# Patient Record
Sex: FEMALE | Race: OTHER | HISPANIC OR LATINO | URBAN - METROPOLITAN AREA
[De-identification: names, ages, dates, MRNs, and addresses within clinical notes are randomized per-mention and may not be internally consistent; named-entity substitution may affect disease eponyms.]

---

## 2018-04-16 ENCOUNTER — EMERGENCY (EMERGENCY)
Facility: HOSPITAL | Age: 73
LOS: 1 days | Discharge: HOME | End: 2018-04-16
Attending: EMERGENCY MEDICINE
Payer: MEDICARE

## 2018-04-16 VITALS — HEIGHT: 61 IN | WEIGHT: 179.9 LBS

## 2018-04-16 VITALS — HEART RATE: 90 BPM | SYSTOLIC BLOOD PRESSURE: 156 MMHG | DIASTOLIC BLOOD PRESSURE: 88 MMHG

## 2018-04-16 PROBLEM — Z00.00 ENCOUNTER FOR PREVENTIVE HEALTH EXAMINATION: Status: ACTIVE | Noted: 2018-04-16

## 2018-04-16 LAB
ALBUMIN SERPL ELPH-MCNC: 3.5 G/DL — SIGNIFICANT CHANGE UP (ref 3.5–5)
ALP SERPL-CCNC: 91 U/L — SIGNIFICANT CHANGE UP (ref 40–120)
ALT FLD-CCNC: 35 U/L DA — SIGNIFICANT CHANGE UP (ref 10–60)
ANION GAP SERPL CALC-SCNC: 8 MMOL/L — SIGNIFICANT CHANGE UP (ref 5–17)
AST SERPL-CCNC: 23 U/L — SIGNIFICANT CHANGE UP (ref 10–40)
BASOPHILS # BLD AUTO: 0.1 K/UL — SIGNIFICANT CHANGE UP (ref 0–0.2)
BASOPHILS NFR BLD AUTO: 0.9 % — SIGNIFICANT CHANGE UP (ref 0–2)
BILIRUB SERPL-MCNC: 0.2 MG/DL — SIGNIFICANT CHANGE UP (ref 0.2–1.2)
BUN SERPL-MCNC: 18 MG/DL — SIGNIFICANT CHANGE UP (ref 7–18)
CALCIUM SERPL-MCNC: 8.7 MG/DL — SIGNIFICANT CHANGE UP (ref 8.4–10.5)
CHLORIDE SERPL-SCNC: 107 MMOL/L — SIGNIFICANT CHANGE UP (ref 96–108)
CO2 SERPL-SCNC: 26 MMOL/L — SIGNIFICANT CHANGE UP (ref 22–31)
CREAT SERPL-MCNC: 0.64 MG/DL — SIGNIFICANT CHANGE UP (ref 0.5–1.3)
D DIMER BLD IA.RAPID-MCNC: 263 NG/ML DDU — HIGH
EOSINOPHIL # BLD AUTO: 0.3 K/UL — SIGNIFICANT CHANGE UP (ref 0–0.5)
EOSINOPHIL NFR BLD AUTO: 3.4 % — SIGNIFICANT CHANGE UP (ref 0–6)
GLUCOSE SERPL-MCNC: 103 MG/DL — HIGH (ref 70–99)
HCT VFR BLD CALC: 41.9 % — SIGNIFICANT CHANGE UP (ref 34.5–45)
HGB BLD-MCNC: 13.9 G/DL — SIGNIFICANT CHANGE UP (ref 11.5–15.5)
LIDOCAIN IGE QN: 211 U/L — SIGNIFICANT CHANGE UP (ref 73–393)
LYMPHOCYTES # BLD AUTO: 2.1 K/UL — SIGNIFICANT CHANGE UP (ref 1–3.3)
LYMPHOCYTES # BLD AUTO: 27.1 % — SIGNIFICANT CHANGE UP (ref 13–44)
MCHC RBC-ENTMCNC: 31.3 PG — SIGNIFICANT CHANGE UP (ref 27–34)
MCHC RBC-ENTMCNC: 33.2 GM/DL — SIGNIFICANT CHANGE UP (ref 32–36)
MCV RBC AUTO: 94.1 FL — SIGNIFICANT CHANGE UP (ref 80–100)
MONOCYTES # BLD AUTO: 0.5 K/UL — SIGNIFICANT CHANGE UP (ref 0–0.9)
MONOCYTES NFR BLD AUTO: 7 % — SIGNIFICANT CHANGE UP (ref 2–14)
NEUTROPHILS # BLD AUTO: 4.8 K/UL — SIGNIFICANT CHANGE UP (ref 1.8–7.4)
NEUTROPHILS NFR BLD AUTO: 61.5 % — SIGNIFICANT CHANGE UP (ref 43–77)
PLATELET # BLD AUTO: 187 K/UL — SIGNIFICANT CHANGE UP (ref 150–400)
POTASSIUM SERPL-MCNC: 4.1 MMOL/L — SIGNIFICANT CHANGE UP (ref 3.5–5.3)
POTASSIUM SERPL-SCNC: 4.1 MMOL/L — SIGNIFICANT CHANGE UP (ref 3.5–5.3)
PROT SERPL-MCNC: 7.5 G/DL — SIGNIFICANT CHANGE UP (ref 6–8.3)
RBC # BLD: 4.45 M/UL — SIGNIFICANT CHANGE UP (ref 3.8–5.2)
RBC # FLD: 12.7 % — SIGNIFICANT CHANGE UP (ref 10.3–14.5)
SODIUM SERPL-SCNC: 141 MMOL/L — SIGNIFICANT CHANGE UP (ref 135–145)
TROPONIN I SERPL-MCNC: <0.015 NG/ML — SIGNIFICANT CHANGE UP (ref 0–0.04)
TROPONIN I SERPL-MCNC: <0.015 NG/ML — SIGNIFICANT CHANGE UP (ref 0–0.04)
WBC # BLD: 7.7 K/UL — SIGNIFICANT CHANGE UP (ref 3.8–10.5)
WBC # FLD AUTO: 7.7 K/UL — SIGNIFICANT CHANGE UP (ref 3.8–10.5)

## 2018-04-16 PROCEDURE — 71046 X-RAY EXAM CHEST 2 VIEWS: CPT

## 2018-04-16 PROCEDURE — 85027 COMPLETE CBC AUTOMATED: CPT

## 2018-04-16 PROCEDURE — 80053 COMPREHEN METABOLIC PANEL: CPT

## 2018-04-16 PROCEDURE — 83690 ASSAY OF LIPASE: CPT

## 2018-04-16 PROCEDURE — 84484 ASSAY OF TROPONIN QUANT: CPT

## 2018-04-16 PROCEDURE — 71046 X-RAY EXAM CHEST 2 VIEWS: CPT | Mod: 26

## 2018-04-16 PROCEDURE — 85379 FIBRIN DEGRADATION QUANT: CPT

## 2018-04-16 PROCEDURE — 99285 EMERGENCY DEPT VISIT HI MDM: CPT

## 2018-04-16 PROCEDURE — 93005 ELECTROCARDIOGRAM TRACING: CPT

## 2018-04-16 PROCEDURE — 99284 EMERGENCY DEPT VISIT MOD MDM: CPT | Mod: 25

## 2018-04-16 RX ORDER — SODIUM CHLORIDE 9 MG/ML
3 INJECTION INTRAMUSCULAR; INTRAVENOUS; SUBCUTANEOUS ONCE
Qty: 0 | Refills: 0 | Status: COMPLETED | OUTPATIENT
Start: 2018-04-16 | End: 2018-04-16

## 2018-04-16 RX ORDER — ASPIRIN/CALCIUM CARB/MAGNESIUM 324 MG
325 TABLET ORAL ONCE
Qty: 0 | Refills: 0 | Status: COMPLETED | OUTPATIENT
Start: 2018-04-16 | End: 2018-04-16

## 2018-04-16 RX ORDER — FAMOTIDINE 10 MG/ML
1 INJECTION INTRAVENOUS
Qty: 28 | Refills: 0
Start: 2018-04-16 | End: 2018-04-29

## 2018-04-16 RX ORDER — NITROGLYCERIN 6.5 MG
0.4 CAPSULE, EXTENDED RELEASE ORAL
Qty: 0 | Refills: 0 | Status: DISCONTINUED | OUTPATIENT
Start: 2018-04-16 | End: 2018-04-20

## 2018-04-16 RX ORDER — FAMOTIDINE 10 MG/ML
20 INJECTION INTRAVENOUS DAILY
Qty: 0 | Refills: 0 | Status: DISCONTINUED | OUTPATIENT
Start: 2018-04-16 | End: 2018-04-20

## 2018-04-16 RX ORDER — SUCRALFATE 1 G
1 TABLET ORAL ONCE
Qty: 0 | Refills: 0 | Status: COMPLETED | OUTPATIENT
Start: 2018-04-16 | End: 2018-04-16

## 2018-04-16 RX ORDER — SUCRALFATE 1 G
1 TABLET ORAL
Qty: 56 | Refills: 0
Start: 2018-04-16 | End: 2018-04-29

## 2018-04-16 RX ADMIN — Medication 325 MILLIGRAM(S): at 18:48

## 2018-04-16 RX ADMIN — FAMOTIDINE 20 MILLIGRAM(S): 10 INJECTION INTRAVENOUS at 18:48

## 2018-04-16 RX ADMIN — Medication 0.4 MILLIGRAM(S): at 18:49

## 2018-04-16 RX ADMIN — Medication 1 GRAM(S): at 18:48

## 2018-04-16 RX ADMIN — SODIUM CHLORIDE 3 MILLILITER(S): 9 INJECTION INTRAMUSCULAR; INTRAVENOUS; SUBCUTANEOUS at 18:33

## 2018-04-16 NOTE — ED PROVIDER NOTE - OBJECTIVE STATEMENT
72 y/o F pt w/ PMhx of HTN, gallstones, HLD present c/o chest pressure radiating up to throat x today. Associated SOB, nausea, lightheadedness. No hx of similar sx. Took 2 Motrins w/ no relief. No recent infections. No hx of PE, DVT, cancer. No recent travel. Only on blood pressure meds. Denies any other complaints. Allergic to codeine.

## 2018-04-16 NOTE — ED PROVIDER NOTE - PROGRESS NOTE DETAILS
labs reviewed, trop neg x 2, ecg no ischemia. cxr no focal consolidation. offered admission, pt and daughter at bedside. both prefer following up with private cardiologist for provocative testing. able to arrange prompt follow up

## 2018-04-16 NOTE — ED PROVIDER NOTE - MEDICAL DECISION MAKING DETAILS
feels better. labs, ecg, cxr reassuring.   Discussed anticipatory guidance and return precautions. Discussed results and gave copy to pt.  Dc with outpt follow up.

## 2020-02-20 PROBLEM — J45.909 UNSPECIFIED ASTHMA, UNCOMPLICATED: Chronic | Status: ACTIVE | Noted: 2018-04-16

## 2020-02-20 PROBLEM — H40.9 UNSPECIFIED GLAUCOMA: Chronic | Status: ACTIVE | Noted: 2018-04-16

## 2020-02-20 PROBLEM — K80.20 CALCULUS OF GALLBLADDER WITHOUT CHOLECYSTITIS WITHOUT OBSTRUCTION: Chronic | Status: ACTIVE | Noted: 2018-04-17

## 2020-02-20 PROBLEM — E78.5 HYPERLIPIDEMIA, UNSPECIFIED: Chronic | Status: ACTIVE | Noted: 2018-04-17

## 2020-02-20 PROBLEM — I10 ESSENTIAL (PRIMARY) HYPERTENSION: Chronic | Status: ACTIVE | Noted: 2018-04-17

## 2020-03-10 ENCOUNTER — APPOINTMENT (OUTPATIENT)
Dept: SURGERY | Facility: CLINIC | Age: 75
End: 2020-03-10
Payer: MEDICARE

## 2020-03-10 VITALS
WEIGHT: 178 LBS | BODY MASS INDEX: 34.95 KG/M2 | HEART RATE: 94 BPM | OXYGEN SATURATION: 94 % | SYSTOLIC BLOOD PRESSURE: 161 MMHG | HEIGHT: 60 IN | DIASTOLIC BLOOD PRESSURE: 82 MMHG

## 2020-03-10 DIAGNOSIS — H40.9 UNSPECIFIED GLAUCOMA: ICD-10-CM

## 2020-03-10 DIAGNOSIS — I10 ESSENTIAL (PRIMARY) HYPERTENSION: ICD-10-CM

## 2020-03-10 DIAGNOSIS — J45.909 UNSPECIFIED ASTHMA, UNCOMPLICATED: ICD-10-CM

## 2020-03-10 PROCEDURE — 99203 OFFICE O/P NEW LOW 30 MIN: CPT

## 2020-03-10 RX ORDER — BUDESONIDE AND FORMOTEROL FUMARATE DIHYDRATE 160; 4.5 UG/1; UG/1
AEROSOL RESPIRATORY (INHALATION)
Refills: 0 | Status: ACTIVE | COMMUNITY

## 2020-03-10 RX ORDER — LATANOPROST/PF 0.005 %
DROPS OPHTHALMIC (EYE)
Refills: 0 | Status: ACTIVE | COMMUNITY

## 2020-03-10 RX ORDER — IRBESARTAN 300 MG/1
TABLET ORAL
Refills: 0 | Status: ACTIVE | COMMUNITY

## 2020-03-10 RX ORDER — URSODIOL 500 MG/1
TABLET ORAL
Refills: 0 | Status: ACTIVE | COMMUNITY

## 2020-03-10 RX ORDER — ICOSAPENT ETHYL 500 MG/1
0.5 CAPSULE ORAL
Refills: 0 | Status: ACTIVE | COMMUNITY

## 2020-03-10 RX ORDER — LEVOCETIRIZINE DIHYDROCHLORIDE 5 MG/1
TABLET ORAL
Refills: 0 | Status: ACTIVE | COMMUNITY

## 2020-03-10 NOTE — HISTORY OF PRESENT ILLNESS
[de-identified] : 76 yo woman referred to me for abdominal pain (r/o hernia) by PMD Dr Mckeon. I recently took care of her  in the hospital with acute cholecystitis / laparoscopic cholecystectomy. \par \par She is complaining of intermittent, sharp, right upper and mid-abdominal pain for the last 6 months. The pain is worsened by physical activity like walking, prolonged standing and bending down. It occasionally radiates to the mid back. No association with food. She has no prior abdominal operations. \par EGD shows small hiatal hernia without other pathology. No change in appetite or bowel habits. She is up to date with her colonoscopy. \par \par CT abdomen report from July 2019 shows cholelithiasis without cholecystitis. There is a report of HIDA scan showing normal EF. She is on Ursodiol. \par There is no hernia described in the CT report and I don't have access to the images. \par \par On exam - she is obese, anicteric. She describes the pain in the right mid-abdomen, but on exam she is tender in the right subcostal area. No peritoneal signs, no palpable mass, no hepato- or splenomegaly, although exam is limited by her obesity. She has a wide diastasis recti. No palpable abdominal wall hernia including the umbilicus and bilateral groin.

## 2020-03-10 NOTE — ASSESSMENT
[FreeTextEntry1] : 74 yo woman with right-sided abdominal pain for > 6 months. \par \par No hernia appreciable on physical exam or reported on CT. \par Diastasis recti and visceral obesity - unlikely the cause of her intermittent sharp pain. \par The most likely explanation for this pain is symptomatic cholelithiasis.\par \par Plan: \par Ultrasound to evaluate the gallbladder\par Will obtain CD with CT scan images - to look for subtle abdominal wall hernia that could be missed on physical exam. \par F/u with me after test results are available

## 2020-03-10 NOTE — CONSULT LETTER
[Dear  ___] : Dear  [unfilled], [Courtesy Letter:] : I had the pleasure of seeing your patient, [unfilled], in my office today. [Please see my note below.] : Please see my note below. [Consult Closing:] : Thank you very much for allowing me to participate in the care of this patient.  If you have any questions, please do not hesitate to contact me. [Sincerely,] : Sincerely, [FreeTextEntry3] : Laci Jane MD

## 2020-03-10 NOTE — PHYSICAL EXAM
[Normal Breath Sounds] : Normal breath sounds [Normal Heart Sounds] : normal heart sounds [Normal Rate and Rhythm] : normal rate and rhythm [Calm] : calm [de-identified] : WNL [de-identified] : WNL [de-identified] : NELSONL [de-identified] : Not done [de-identified] : See HPI [de-identified] : not done [de-identified] : not done [de-identified] : WNL [de-identified] : anicteric

## 2020-06-11 ENCOUNTER — APPOINTMENT (OUTPATIENT)
Dept: ULTRASOUND IMAGING | Facility: HOSPITAL | Age: 75
End: 2020-06-11
Payer: MEDICARE

## 2020-06-11 ENCOUNTER — RESULT REVIEW (OUTPATIENT)
Age: 75
End: 2020-06-11

## 2020-06-11 ENCOUNTER — OUTPATIENT (OUTPATIENT)
Dept: OUTPATIENT SERVICES | Facility: HOSPITAL | Age: 75
LOS: 1 days | End: 2020-06-11
Payer: MEDICARE

## 2020-06-11 DIAGNOSIS — K80.20 CALCULUS OF GALLBLADDER WITHOUT CHOLECYSTITIS WITHOUT OBSTRUCTION: ICD-10-CM

## 2020-06-11 PROCEDURE — 76700 US EXAM ABDOM COMPLETE: CPT | Mod: 26

## 2020-06-11 PROCEDURE — 76700 US EXAM ABDOM COMPLETE: CPT

## 2020-06-16 ENCOUNTER — APPOINTMENT (OUTPATIENT)
Dept: SURGERY | Facility: CLINIC | Age: 75
End: 2020-06-16
Payer: MEDICARE

## 2020-06-16 VITALS
SYSTOLIC BLOOD PRESSURE: 167 MMHG | BODY MASS INDEX: 39.37 KG/M2 | HEIGHT: 56 IN | DIASTOLIC BLOOD PRESSURE: 88 MMHG | WEIGHT: 175 LBS | HEART RATE: 89 BPM

## 2020-06-16 PROCEDURE — 99213 OFFICE O/P EST LOW 20 MIN: CPT

## 2020-06-16 NOTE — ASSESSMENT
[FreeTextEntry1] : Symptomatic cholelithiasis\par Risk, benefits and alternatives to surgery were discussed with the patient and her daughter - all questions answered. \par \par [facility]

## 2020-06-16 NOTE — PHYSICAL EXAM
[Normal Breath Sounds] : Normal breath sounds [Normal Heart Sounds] : normal heart sounds [Normal Rate and Rhythm] : normal rate and rhythm [Calm] : calm [de-identified] : WNL [de-identified] : WNL [de-identified] : NELSONL [de-identified] : Not done [de-identified] : Obese, RUQ tenderness, no peritonitis, no hernia.  [de-identified] : not done [de-identified] : not done [de-identified] : WNL [de-identified] : anicteric

## 2020-06-30 ENCOUNTER — INPATIENT (INPATIENT)
Facility: HOSPITAL | Age: 75
LOS: 2 days | Discharge: HOME | DRG: 354 | End: 2020-07-03
Attending: SURGERY | Admitting: SURGERY
Payer: MEDICARE

## 2020-06-30 VITALS
HEART RATE: 93 BPM | DIASTOLIC BLOOD PRESSURE: 83 MMHG | WEIGHT: 175.05 LBS | HEIGHT: 56 IN | SYSTOLIC BLOOD PRESSURE: 158 MMHG | OXYGEN SATURATION: 96 % | TEMPERATURE: 98 F | RESPIRATION RATE: 20 BRPM

## 2020-06-30 DIAGNOSIS — K80.20 CALCULUS OF GALLBLADDER WITHOUT CHOLECYSTITIS WITHOUT OBSTRUCTION: ICD-10-CM

## 2020-06-30 DIAGNOSIS — R10.31 RIGHT LOWER QUADRANT PAIN: ICD-10-CM

## 2020-06-30 DIAGNOSIS — N93.9 ABNORMAL UTERINE AND VAGINAL BLEEDING, UNSPECIFIED: ICD-10-CM

## 2020-06-30 DIAGNOSIS — Z29.9 ENCOUNTER FOR PROPHYLACTIC MEASURES, UNSPECIFIED: ICD-10-CM

## 2020-06-30 DIAGNOSIS — I10 ESSENTIAL (PRIMARY) HYPERTENSION: ICD-10-CM

## 2020-06-30 DIAGNOSIS — J45.909 UNSPECIFIED ASTHMA, UNCOMPLICATED: ICD-10-CM

## 2020-06-30 DIAGNOSIS — N13.30 UNSPECIFIED HYDRONEPHROSIS: ICD-10-CM

## 2020-06-30 LAB
ALBUMIN SERPL ELPH-MCNC: 3.6 G/DL — SIGNIFICANT CHANGE UP (ref 3.5–5)
ALP SERPL-CCNC: 78 U/L — SIGNIFICANT CHANGE UP (ref 40–120)
ALT FLD-CCNC: 44 U/L DA — SIGNIFICANT CHANGE UP (ref 10–60)
ANION GAP SERPL CALC-SCNC: 6 MMOL/L — SIGNIFICANT CHANGE UP (ref 5–17)
APPEARANCE UR: CLEAR — SIGNIFICANT CHANGE UP
AST SERPL-CCNC: 29 U/L — SIGNIFICANT CHANGE UP (ref 10–40)
BASOPHILS # BLD AUTO: 0.02 K/UL — SIGNIFICANT CHANGE UP (ref 0–0.2)
BASOPHILS NFR BLD AUTO: 0.3 % — SIGNIFICANT CHANGE UP (ref 0–2)
BILIRUB SERPL-MCNC: 0.4 MG/DL — SIGNIFICANT CHANGE UP (ref 0.2–1.2)
BILIRUB UR-MCNC: NEGATIVE — SIGNIFICANT CHANGE UP
BUN SERPL-MCNC: 10 MG/DL — SIGNIFICANT CHANGE UP (ref 7–18)
CALCIUM SERPL-MCNC: 9.1 MG/DL — SIGNIFICANT CHANGE UP (ref 8.4–10.5)
CHLORIDE SERPL-SCNC: 108 MMOL/L — SIGNIFICANT CHANGE UP (ref 96–108)
CO2 SERPL-SCNC: 28 MMOL/L — SIGNIFICANT CHANGE UP (ref 22–31)
COLOR SPEC: YELLOW — SIGNIFICANT CHANGE UP
CREAT SERPL-MCNC: 0.54 MG/DL — SIGNIFICANT CHANGE UP (ref 0.5–1.3)
DIFF PNL FLD: NEGATIVE — SIGNIFICANT CHANGE UP
EOSINOPHIL # BLD AUTO: 0.15 K/UL — SIGNIFICANT CHANGE UP (ref 0–0.5)
EOSINOPHIL NFR BLD AUTO: 2.6 % — SIGNIFICANT CHANGE UP (ref 0–6)
GLUCOSE SERPL-MCNC: 104 MG/DL — HIGH (ref 70–99)
GLUCOSE UR QL: NEGATIVE — SIGNIFICANT CHANGE UP
HCT VFR BLD CALC: 41.8 % — SIGNIFICANT CHANGE UP (ref 34.5–45)
HGB BLD-MCNC: 14.1 G/DL — SIGNIFICANT CHANGE UP (ref 11.5–15.5)
IMM GRANULOCYTES NFR BLD AUTO: 0.3 % — SIGNIFICANT CHANGE UP (ref 0–1.5)
KETONES UR-MCNC: NEGATIVE — SIGNIFICANT CHANGE UP
LEUKOCYTE ESTERASE UR-ACNC: NEGATIVE — SIGNIFICANT CHANGE UP
LIDOCAIN IGE QN: 130 U/L — SIGNIFICANT CHANGE UP (ref 73–393)
LYMPHOCYTES # BLD AUTO: 1.72 K/UL — SIGNIFICANT CHANGE UP (ref 1–3.3)
LYMPHOCYTES # BLD AUTO: 29.6 % — SIGNIFICANT CHANGE UP (ref 13–44)
MCHC RBC-ENTMCNC: 30.4 PG — SIGNIFICANT CHANGE UP (ref 27–34)
MCHC RBC-ENTMCNC: 33.7 GM/DL — SIGNIFICANT CHANGE UP (ref 32–36)
MCV RBC AUTO: 90.1 FL — SIGNIFICANT CHANGE UP (ref 80–100)
MONOCYTES # BLD AUTO: 0.36 K/UL — SIGNIFICANT CHANGE UP (ref 0–0.9)
MONOCYTES NFR BLD AUTO: 6.2 % — SIGNIFICANT CHANGE UP (ref 2–14)
NEUTROPHILS # BLD AUTO: 3.54 K/UL — SIGNIFICANT CHANGE UP (ref 1.8–7.4)
NEUTROPHILS NFR BLD AUTO: 61 % — SIGNIFICANT CHANGE UP (ref 43–77)
NITRITE UR-MCNC: NEGATIVE — SIGNIFICANT CHANGE UP
NRBC # BLD: 0 /100 WBCS — SIGNIFICANT CHANGE UP (ref 0–0)
PH UR: 5 — SIGNIFICANT CHANGE UP (ref 5–8)
PLATELET # BLD AUTO: 228 K/UL — SIGNIFICANT CHANGE UP (ref 150–400)
POTASSIUM SERPL-MCNC: 3.8 MMOL/L — SIGNIFICANT CHANGE UP (ref 3.5–5.3)
POTASSIUM SERPL-SCNC: 3.8 MMOL/L — SIGNIFICANT CHANGE UP (ref 3.5–5.3)
PROT SERPL-MCNC: 7.7 G/DL — SIGNIFICANT CHANGE UP (ref 6–8.3)
PROT UR-MCNC: NEGATIVE — SIGNIFICANT CHANGE UP
RBC # BLD: 4.64 M/UL — SIGNIFICANT CHANGE UP (ref 3.8–5.2)
RBC # FLD: 13.2 % — SIGNIFICANT CHANGE UP (ref 10.3–14.5)
SODIUM SERPL-SCNC: 142 MMOL/L — SIGNIFICANT CHANGE UP (ref 135–145)
SP GR SPEC: 1.01 — SIGNIFICANT CHANGE UP (ref 1.01–1.02)
TROPONIN I SERPL-MCNC: <0.015 NG/ML — SIGNIFICANT CHANGE UP (ref 0–0.04)
UROBILINOGEN FLD QL: NEGATIVE — SIGNIFICANT CHANGE UP
WBC # BLD: 5.81 K/UL — SIGNIFICANT CHANGE UP (ref 3.8–10.5)
WBC # FLD AUTO: 5.81 K/UL — SIGNIFICANT CHANGE UP (ref 3.8–10.5)

## 2020-06-30 PROCEDURE — 99221 1ST HOSP IP/OBS SF/LOW 40: CPT | Mod: GC

## 2020-06-30 PROCEDURE — 74177 CT ABD & PELVIS W/CONTRAST: CPT | Mod: 26

## 2020-06-30 PROCEDURE — 99223 1ST HOSP IP/OBS HIGH 75: CPT

## 2020-06-30 PROCEDURE — 71045 X-RAY EXAM CHEST 1 VIEW: CPT | Mod: 26

## 2020-06-30 PROCEDURE — 99285 EMERGENCY DEPT VISIT HI MDM: CPT

## 2020-06-30 RX ORDER — SODIUM CHLORIDE 9 MG/ML
1000 INJECTION INTRAMUSCULAR; INTRAVENOUS; SUBCUTANEOUS ONCE
Refills: 0 | Status: COMPLETED | OUTPATIENT
Start: 2020-06-30 | End: 2020-06-30

## 2020-06-30 RX ORDER — IRBESARTAN 75 MG/1
1 TABLET ORAL
Qty: 0 | Refills: 0 | DISCHARGE

## 2020-06-30 RX ORDER — BUDESONIDE AND FORMOTEROL FUMARATE DIHYDRATE 160; 4.5 UG/1; UG/1
0 AEROSOL RESPIRATORY (INHALATION)
Qty: 0 | Refills: 0 | DISCHARGE

## 2020-06-30 RX ORDER — ACETAMINOPHEN 500 MG
650 TABLET ORAL ONCE
Refills: 0 | Status: COMPLETED | OUTPATIENT
Start: 2020-06-30 | End: 2020-06-30

## 2020-06-30 RX ORDER — BUDESONIDE AND FORMOTEROL FUMARATE DIHYDRATE 160; 4.5 UG/1; UG/1
2 AEROSOL RESPIRATORY (INHALATION)
Refills: 0 | Status: DISCONTINUED | OUTPATIENT
Start: 2020-06-30 | End: 2020-07-02

## 2020-06-30 RX ORDER — IOHEXOL 300 MG/ML
30 INJECTION, SOLUTION INTRAVENOUS ONCE
Refills: 0 | Status: COMPLETED | OUTPATIENT
Start: 2020-06-30 | End: 2020-06-30

## 2020-06-30 RX ORDER — HEPARIN SODIUM 5000 [USP'U]/ML
5000 INJECTION INTRAVENOUS; SUBCUTANEOUS EVERY 8 HOURS
Refills: 0 | Status: DISCONTINUED | OUTPATIENT
Start: 2020-06-30 | End: 2020-07-02

## 2020-06-30 RX ORDER — AMLODIPINE BESYLATE 2.5 MG/1
5 TABLET ORAL DAILY
Refills: 0 | Status: DISCONTINUED | OUTPATIENT
Start: 2020-06-30 | End: 2020-07-02

## 2020-06-30 RX ORDER — LATANOPROST 0.05 MG/ML
1 SOLUTION/ DROPS OPHTHALMIC; TOPICAL AT BEDTIME
Refills: 0 | Status: DISCONTINUED | OUTPATIENT
Start: 2020-06-30 | End: 2020-07-02

## 2020-06-30 RX ORDER — ACETAMINOPHEN 500 MG
1000 TABLET ORAL ONCE
Refills: 0 | Status: COMPLETED | OUTPATIENT
Start: 2020-06-30 | End: 2020-06-30

## 2020-06-30 RX ORDER — FAMOTIDINE 10 MG/ML
20 INJECTION INTRAVENOUS
Refills: 0 | Status: DISCONTINUED | OUTPATIENT
Start: 2020-06-30 | End: 2020-07-02

## 2020-06-30 RX ORDER — ERGOCALCIFEROL 1.25 MG/1
0 CAPSULE ORAL
Qty: 0 | Refills: 0 | DISCHARGE

## 2020-06-30 RX ORDER — LORATADINE 10 MG/1
10 TABLET ORAL DAILY
Refills: 0 | Status: DISCONTINUED | OUTPATIENT
Start: 2020-06-30 | End: 2020-07-02

## 2020-06-30 RX ORDER — SUCRALFATE 1 G
1 TABLET ORAL EVERY 6 HOURS
Refills: 0 | Status: DISCONTINUED | OUTPATIENT
Start: 2020-06-30 | End: 2020-07-02

## 2020-06-30 RX ORDER — ERGOCALCIFEROL 1.25 MG/1
50000 CAPSULE ORAL
Refills: 0 | Status: DISCONTINUED | OUTPATIENT
Start: 2020-06-30 | End: 2020-07-02

## 2020-06-30 RX ORDER — LATANOPROST 0.05 MG/ML
1 SOLUTION/ DROPS OPHTHALMIC; TOPICAL
Qty: 0 | Refills: 0 | DISCHARGE

## 2020-06-30 RX ORDER — LEVOCETIRIZINE DIHYDROCHLORIDE 0.5 MG/ML
1 SOLUTION ORAL
Qty: 0 | Refills: 0 | DISCHARGE

## 2020-06-30 RX ORDER — ONDANSETRON 8 MG/1
4 TABLET, FILM COATED ORAL ONCE
Refills: 0 | Status: COMPLETED | OUTPATIENT
Start: 2020-06-30 | End: 2020-06-30

## 2020-06-30 RX ORDER — LOSARTAN POTASSIUM 100 MG/1
100 TABLET, FILM COATED ORAL DAILY
Refills: 0 | Status: DISCONTINUED | OUTPATIENT
Start: 2020-06-30 | End: 2020-07-02

## 2020-06-30 RX ORDER — OMEPRAZOLE 10 MG/1
0 CAPSULE, DELAYED RELEASE ORAL
Qty: 0 | Refills: 0 | DISCHARGE

## 2020-06-30 RX ORDER — MORPHINE SULFATE 50 MG/1
4 CAPSULE, EXTENDED RELEASE ORAL ONCE
Refills: 0 | Status: DISCONTINUED | OUTPATIENT
Start: 2020-06-30 | End: 2020-06-30

## 2020-06-30 RX ORDER — ONDANSETRON 8 MG/1
4 TABLET, FILM COATED ORAL EVERY 6 HOURS
Refills: 0 | Status: DISCONTINUED | OUTPATIENT
Start: 2020-06-30 | End: 2020-07-02

## 2020-06-30 RX ORDER — DEXTROSE MONOHYDRATE, SODIUM CHLORIDE, AND POTASSIUM CHLORIDE 50; .745; 4.5 G/1000ML; G/1000ML; G/1000ML
1000 INJECTION, SOLUTION INTRAVENOUS
Refills: 0 | Status: DISCONTINUED | OUTPATIENT
Start: 2020-06-30 | End: 2020-07-02

## 2020-06-30 RX ADMIN — BUDESONIDE AND FORMOTEROL FUMARATE DIHYDRATE 2 PUFF(S): 160; 4.5 AEROSOL RESPIRATORY (INHALATION) at 21:56

## 2020-06-30 RX ADMIN — Medication 400 MILLIGRAM(S): at 16:21

## 2020-06-30 RX ADMIN — ONDANSETRON 4 MILLIGRAM(S): 8 TABLET, FILM COATED ORAL at 21:57

## 2020-06-30 RX ADMIN — LOSARTAN POTASSIUM 100 MILLIGRAM(S): 100 TABLET, FILM COATED ORAL at 16:21

## 2020-06-30 RX ADMIN — SODIUM CHLORIDE 1000 MILLILITER(S): 9 INJECTION INTRAMUSCULAR; INTRAVENOUS; SUBCUTANEOUS at 15:26

## 2020-06-30 RX ADMIN — HEPARIN SODIUM 5000 UNIT(S): 5000 INJECTION INTRAVENOUS; SUBCUTANEOUS at 21:57

## 2020-06-30 RX ADMIN — LATANOPROST 1 DROP(S): 0.05 SOLUTION/ DROPS OPHTHALMIC; TOPICAL at 21:56

## 2020-06-30 RX ADMIN — FAMOTIDINE 20 MILLIGRAM(S): 10 INJECTION INTRAVENOUS at 17:01

## 2020-06-30 RX ADMIN — IOHEXOL 30 MILLILITER(S): 300 INJECTION, SOLUTION INTRAVENOUS at 15:23

## 2020-06-30 RX ADMIN — MORPHINE SULFATE 4 MILLIGRAM(S): 50 CAPSULE, EXTENDED RELEASE ORAL at 15:23

## 2020-06-30 RX ADMIN — SODIUM CHLORIDE 1000 MILLILITER(S): 9 INJECTION INTRAMUSCULAR; INTRAVENOUS; SUBCUTANEOUS at 15:24

## 2020-06-30 RX ADMIN — ERGOCALCIFEROL 50000 UNIT(S): 1.25 CAPSULE ORAL at 17:01

## 2020-06-30 RX ADMIN — DEXTROSE MONOHYDRATE, SODIUM CHLORIDE, AND POTASSIUM CHLORIDE 125 MILLILITER(S): 50; .745; 4.5 INJECTION, SOLUTION INTRAVENOUS at 17:02

## 2020-06-30 RX ADMIN — Medication 1 GRAM(S): at 17:01

## 2020-06-30 RX ADMIN — Medication 650 MILLIGRAM(S): at 21:56

## 2020-06-30 NOTE — CONSULT NOTE ADULT - ATTENDING COMMENTS
Patient was interviewed and examined and discussed with Dr. Cabezas.   She is admitted for lap gio.  Exercise tolerance, and risk assessment, as above.   ROS yields information that she has had a minimal amount of vaginal bleeding x 1.   Suggest GYN consultation and evaluation of bleeding to exclude uterine mass.  Discussed with surgical house officer and attending surgeon, as well.

## 2020-06-30 NOTE — ED ADULT TRIAGE NOTE - CHIEF COMPLAINT QUOTE
Mid abd to lower abd pain, worse x Sunday. Pt scheduled for Gall Bladder surgery next week. No n/v/d Mid abd to lower abd pain, worse x Sunday. Pt scheduled for Gall Bladder surgery next week. No n/v/d. Also c/o blood in urine x 2 days.

## 2020-06-30 NOTE — H&P ADULT - NSHPPHYSICALEXAM_GEN_ALL_CORE
Alert nad  Obese  ICU Vital Signs Last 24 Hrs  T(C): 37 (30 Jun 2020 15:39), Max: 37 (30 Jun 2020 15:39)  T(F): 98.6 (30 Jun 2020 15:39), Max: 98.6 (30 Jun 2020 15:39)  HR: 107 (30 Jun 2020 15:39) (93 - 107)  BP: 167/92 (30 Jun 2020 15:39) (158/83 - 167/92)  BP(mean): --  ABP: --  ABP(mean): --  RR: 18 (30 Jun 2020 15:39) (18 - 20)  SpO2: 95% (30 Jun 2020 15:39) (95% - 96%)    Abd: soft minimal lower abd tenderness, no guarding no rebound tendereness  lungs: cta  EXT: no calf swelling or erythema  s1s2 no m

## 2020-06-30 NOTE — H&P ADULT - NSHPLABSRESULTS_GEN_ALL_CORE
14.1   5.81  )-----------( 228      ( 30 Jun 2020 14:35 )             41.8    06-30    142  |  108  |  10  ----------------------------<  104<H>  3.8   |  28  |  0.54    Ca    9.1      30 Jun 2020 14:35    TPro  7.7  /  Alb  3.6  /  TBili  0.4  /  DBili  x   /  AST  29  /  ALT  44  /  AlkPhos  78  06-30    < from: US Abdomen Complete (06.11.20 @ 09:46) >    IMPRESSION:   Cholelithiasis. No ultrasound evidence of acute cholecystitis.    Mild left hydronephrosis.    Mild hepatomegaly and hepatic steatosis which limits evaluation for underlying mass.    < end of copied text >

## 2020-06-30 NOTE — CONSULT NOTE ADULT - NSTELEHEALTH_GEN_ALL_CORE
“You can access the FollowHealth Patient Portal, offered by [facility], by registering with the following website: [email or web address]” No

## 2020-06-30 NOTE — CONSULT NOTE ADULT - PROBLEM SELECTOR RECOMMENDATION 5
Ultrasound showed Cholelithiasis  CT abdomen showed mild Hepatomegaly and Hepatic steatosis  NPO for now

## 2020-06-30 NOTE — H&P ADULT - HISTORY OF PRESENT ILLNESS
75 female PMHx HTN, Asthma, glaucoma, Cholelithiasis, c/o 2weeks hx abdominal pain. Pt indicated lower abdomen radiating to upper abdomen. Pain cramping and intermitent, not associated with any particular activity. No n/v. No change in BM. No fever reported. No hematemesis, no dysuria or hematuria. No hx jaundice. No cp or sob.  Pt is presently drinking contrast for CT ordered by ER  No covid contacts reported.

## 2020-06-30 NOTE — ED ADULT NURSE NOTE - CHIEF COMPLAINT QUOTE
Mid abd to lower abd pain, worse x Sunday. Pt scheduled for Gall Bladder surgery next week. No n/v/d. Also c/o blood in urine x 2 days.

## 2020-06-30 NOTE — ED PROVIDER NOTE - OBJECTIVE STATEMENT
75 year old female presenting to the ED with complaints of right sided and epigastric abdominal pain. Patient denies nausea, vomiting, fever, or any other acute complaints. Patient's surgeon Dr Jorgensen reports that the patient has cholelithiasis and wants the patient admitted to his services.

## 2020-06-30 NOTE — H&P ADULT - ASSESSMENT
abd pain  cholelithiasis  left hydronephrosis  hepatomegaly and mild hepatic steatosis    CT pending  Med consult  npo  ivf  possible plan lap gio for biliary colic pending completion workup

## 2020-07-01 ENCOUNTER — TRANSCRIPTION ENCOUNTER (OUTPATIENT)
Age: 75
End: 2020-07-01

## 2020-07-01 LAB
ALBUMIN SERPL ELPH-MCNC: 3.3 G/DL — LOW (ref 3.5–5)
ALP SERPL-CCNC: 67 U/L — SIGNIFICANT CHANGE UP (ref 40–120)
ALT FLD-CCNC: 39 U/L DA — SIGNIFICANT CHANGE UP (ref 10–60)
ANION GAP SERPL CALC-SCNC: 6 MMOL/L — SIGNIFICANT CHANGE UP (ref 5–17)
AST SERPL-CCNC: 22 U/L — SIGNIFICANT CHANGE UP (ref 10–40)
BILIRUB SERPL-MCNC: 0.5 MG/DL — SIGNIFICANT CHANGE UP (ref 0.2–1.2)
BUN SERPL-MCNC: 7 MG/DL — SIGNIFICANT CHANGE UP (ref 7–18)
CALCIUM SERPL-MCNC: 8.4 MG/DL — SIGNIFICANT CHANGE UP (ref 8.4–10.5)
CHLORIDE SERPL-SCNC: 108 MMOL/L — SIGNIFICANT CHANGE UP (ref 96–108)
CO2 SERPL-SCNC: 28 MMOL/L — SIGNIFICANT CHANGE UP (ref 22–31)
CREAT SERPL-MCNC: 0.49 MG/DL — LOW (ref 0.5–1.3)
GLUCOSE SERPL-MCNC: 102 MG/DL — HIGH (ref 70–99)
HCT VFR BLD CALC: 39 % — SIGNIFICANT CHANGE UP (ref 34.5–45)
HCV AB S/CO SERPL IA: 0.08 S/CO — SIGNIFICANT CHANGE UP (ref 0–0.99)
HCV AB SERPL-IMP: SIGNIFICANT CHANGE UP
HGB BLD-MCNC: 13.1 G/DL — SIGNIFICANT CHANGE UP (ref 11.5–15.5)
MCHC RBC-ENTMCNC: 30.5 PG — SIGNIFICANT CHANGE UP (ref 27–34)
MCHC RBC-ENTMCNC: 33.6 GM/DL — SIGNIFICANT CHANGE UP (ref 32–36)
MCV RBC AUTO: 90.9 FL — SIGNIFICANT CHANGE UP (ref 80–100)
NRBC # BLD: 0 /100 WBCS — SIGNIFICANT CHANGE UP (ref 0–0)
PLATELET # BLD AUTO: 203 K/UL — SIGNIFICANT CHANGE UP (ref 150–400)
POTASSIUM SERPL-MCNC: 3.9 MMOL/L — SIGNIFICANT CHANGE UP (ref 3.5–5.3)
POTASSIUM SERPL-SCNC: 3.9 MMOL/L — SIGNIFICANT CHANGE UP (ref 3.5–5.3)
PROT SERPL-MCNC: 6.9 G/DL — SIGNIFICANT CHANGE UP (ref 6–8.3)
RBC # BLD: 4.29 M/UL — SIGNIFICANT CHANGE UP (ref 3.8–5.2)
RBC # FLD: 13.2 % — SIGNIFICANT CHANGE UP (ref 10.3–14.5)
SARS-COV-2 RNA SPEC QL NAA+PROBE: SIGNIFICANT CHANGE UP
SODIUM SERPL-SCNC: 142 MMOL/L — SIGNIFICANT CHANGE UP (ref 135–145)
WBC # BLD: 6.97 K/UL — SIGNIFICANT CHANGE UP (ref 3.8–10.5)
WBC # FLD AUTO: 6.97 K/UL — SIGNIFICANT CHANGE UP (ref 3.8–10.5)

## 2020-07-01 PROCEDURE — 99232 SBSQ HOSP IP/OBS MODERATE 35: CPT | Mod: 57

## 2020-07-01 RX ORDER — CHLORHEXIDINE GLUCONATE 213 G/1000ML
1 SOLUTION TOPICAL DAILY
Refills: 0 | Status: COMPLETED | OUTPATIENT
Start: 2020-07-01 | End: 2020-07-02

## 2020-07-01 RX ADMIN — FAMOTIDINE 20 MILLIGRAM(S): 10 INJECTION INTRAVENOUS at 06:15

## 2020-07-01 RX ADMIN — DEXTROSE MONOHYDRATE, SODIUM CHLORIDE, AND POTASSIUM CHLORIDE 125 MILLILITER(S): 50; .745; 4.5 INJECTION, SOLUTION INTRAVENOUS at 01:40

## 2020-07-01 RX ADMIN — Medication 1 GRAM(S): at 12:15

## 2020-07-01 RX ADMIN — DEXTROSE MONOHYDRATE, SODIUM CHLORIDE, AND POTASSIUM CHLORIDE 125 MILLILITER(S): 50; .745; 4.5 INJECTION, SOLUTION INTRAVENOUS at 08:41

## 2020-07-01 RX ADMIN — HEPARIN SODIUM 5000 UNIT(S): 5000 INJECTION INTRAVENOUS; SUBCUTANEOUS at 21:14

## 2020-07-01 RX ADMIN — HEPARIN SODIUM 5000 UNIT(S): 5000 INJECTION INTRAVENOUS; SUBCUTANEOUS at 05:50

## 2020-07-01 RX ADMIN — LOSARTAN POTASSIUM 100 MILLIGRAM(S): 100 TABLET, FILM COATED ORAL at 05:50

## 2020-07-01 RX ADMIN — BUDESONIDE AND FORMOTEROL FUMARATE DIHYDRATE 2 PUFF(S): 160; 4.5 AEROSOL RESPIRATORY (INHALATION) at 22:01

## 2020-07-01 RX ADMIN — Medication 1 GRAM(S): at 17:22

## 2020-07-01 RX ADMIN — Medication 1 GRAM(S): at 05:51

## 2020-07-01 RX ADMIN — DEXTROSE MONOHYDRATE, SODIUM CHLORIDE, AND POTASSIUM CHLORIDE 125 MILLILITER(S): 50; .745; 4.5 INJECTION, SOLUTION INTRAVENOUS at 17:54

## 2020-07-01 RX ADMIN — LATANOPROST 1 DROP(S): 0.05 SOLUTION/ DROPS OPHTHALMIC; TOPICAL at 22:01

## 2020-07-01 RX ADMIN — Medication 1 GRAM(S): at 23:11

## 2020-07-01 RX ADMIN — BUDESONIDE AND FORMOTEROL FUMARATE DIHYDRATE 2 PUFF(S): 160; 4.5 AEROSOL RESPIRATORY (INHALATION) at 09:23

## 2020-07-01 RX ADMIN — AMLODIPINE BESYLATE 5 MILLIGRAM(S): 2.5 TABLET ORAL at 05:51

## 2020-07-01 RX ADMIN — LORATADINE 10 MILLIGRAM(S): 10 TABLET ORAL at 12:15

## 2020-07-01 RX ADMIN — FAMOTIDINE 20 MILLIGRAM(S): 10 INJECTION INTRAVENOUS at 17:22

## 2020-07-01 RX ADMIN — HEPARIN SODIUM 5000 UNIT(S): 5000 INJECTION INTRAVENOUS; SUBCUTANEOUS at 13:54

## 2020-07-01 RX ADMIN — Medication 1 GRAM(S): at 00:11

## 2020-07-01 NOTE — CONSULT NOTE ADULT - PROBLEM SELECTOR RECOMMENDATION 9
Patient's Revised Cardiac Risk Index (RCRI) score is 1 ( Class IIrisk) and Aleman score is 0.2% risk. Patient is Low at risk of  adverse perioperative cardiac events undergoing intermediate risk surgery.
resolved 2 weeks ago, currently no complaints  d/w Dr. DEL Franco, will follow up further as outpatient   no further gyn workup or interventions at this time

## 2020-07-01 NOTE — CONSULT NOTE ADULT - ASSESSMENT
74yo female admitted for hernia repair, no gyn involvement at this time
74 y/o F with PMHx HTN, Asthma, glaucoma, Cholelithiasis, c/o 2weeks hx abdominal pain. Pt indicated lower abdomen radiating to upper abdomen. Consulted Internal medicine for Medical Clearance for Laparoscopic Cholecystectomy. Pt was having vaginal bleeding and was following with Gynecology as out patient and her last visit was last week. No interventions were done. She is intermediate risk for surgery.

## 2020-07-01 NOTE — CONSULT NOTE ADULT - SUBJECTIVE AND OBJECTIVE BOX
HPI: 75yy  LMP at 58years old admitted for laparoscopic hernia repair in the am, gyn called as patient reported episode of vaginal bleeding. Upon evaluation, patient states she had an episode of spotting 2 weeks ago that only lasted for the day and resolved spontaneously. Currently denies any vaginal bleeding or gyn complaints. Denies  vb, vaginal discharge; pelvic pain, abd pain, cp, sob, dizziness, palpitations, n/v/d/c, fever; chills or any other gyn related issues.    pob/gynhx:   x2; etop x1; denies std's; no abn pap smears/mammograms, last one ; follows with Dr. Franco  pmhx: asthma, htn, hyperlipidemia, gallstones  pshx: bladder prolapse surgery 15yrs ago for prolapse; carpal tunnel and rotator cuff surgery  all: codeine> SOB, hives  meds: see reconciliation  sochx: no etoh/drug/tobacco use    REVIEW OF SYSTEMS: see HPI	    PE:  Vital Signs Last 24 Hrs  T(C): 36.7 (2020 13:41), Max: 37.1 (2020 10:00)  T(F): 98.1 (2020 13:41), Max: 98.7 (2020 10:00)  HR: 81 (2020 13:41) (78 - 107)  BP: 138/72 (2020 13:41) (128/59 - 167/92)  BP(mean): 106 (2020 19:15) (106 - 106)  RR: 16 (2020 13:41) (16 - 18)  SpO2: 95% (2020 13:41) (93% - 98%)    abd: +bs; soft, nt, nd, no rebound or guarding; no cvat b/l  pelvis: gyn exam deferred by patient; denies vaginal bleeding or abnormal discharge; no masses appreciated b/l       LABS:                        13.1   6.97  )-----------( 203      ( 2020 06:46 )             39.0     07-01    142  |  108  |  7   ----------------------------<  102<H>  3.9   |  28  |  0.49<L>    Ca    8.4      2020 06:46    TPro  6.9  /  Alb  3.3<L>  /  TBili  0.5  /  DBili  x   /  AST  22  /  ALT  39  /  AlkPhos  67  07-01      Urinalysis Basic - ( 2020 14:35 )    Color: Yellow / Appearance: Clear / S.010 / pH: x  Gluc: x / Ketone: Negative  / Bili: Negative / Urobili: Negative   Blood: x / Protein: Negative / Nitrite: Negative   Leuk Esterase: Negative / RBC: x / WBC x   Sq Epi: x / Non Sq Epi: x / Bacteria: x    RADIOLOGY & ADDITIONAL STUDIES:  CT Abdomen and Pelvis w/ Oral Cont and w/ IV Cont (20 @ 18:27) >  EXAM:  CT ABDOMEN AND PELVIS OC IC                        PROCEDURE DATE:  2020    INTERPRETATION:  CLINICAL INDICATION: 75 years  Female with abdominal pain right sided /.     REPRODUCTIVE ORGANS: Heterogeneous mildly enlarged uterus with punctate calcifications.      d/w Dr. DEL Franco
76 y/o F with  PMHx HTN, Asthma, glaucoma, Cholelithiasis, c/o 2weeks hx abdominal pain. Pt indicated lower abdomen radiating to upper abdomen. Pain cramping and intermittent not associated with any particular activity. No n/v. No change in BM. No fever reported. No hematemesis, no dysuria or hematuria. No hx jaundice. No cp or sob.  No covid contacts reported.    REVIEW OF SYSTEMS:    CONSTITUTIONAL: No weakness, fevers or chills  EYES/ENT: No visual changes;  No vertigo or throat pain   NECK: No pain or stiffness  RESPIRATORY: No cough, wheezing, hemoptysis; No shortness of breath  CARDIOVASCULAR: No chest pain or palpitations  GASTROINTESTINAL: Abdominal pain   GENITOURINARY: No dysuria, frequency or hematuria  NEUROLOGICAL: No numbness or weakness  SKIN: No itching, burning, rashes, or lesions   All other review of systems is negative unless indicated above.      Vital Signs Last 24 Hrs  T(C): 36.9 (2020 19:15), Max: 37 (2020 15:39)  T(F): 98.4 (2020 19:15), Max: 98.6 (2020 15:39)  HR: 103 (2020 19:15) (93 - 107)  BP: 155/89 (2020 19:15) (155/89 - 167/92)  BP(mean): 106 (2020 19:15) (106 - 106)  RR: 18 (2020 19:15) (18 - 20)  SpO2: 98% (2020 19:15) (95% - 98%)    PHYSICAL EXAM:  GENERAL: NAD, obese  HEAD:  Atraumatic, Normocephalic  EYES:  conjunctiva and sclera clear  NECK: Supple, No JVD, Normal thyroid  CHEST/LUNG: Clear to auscultation. Clear to percussion bilaterally; No rales, rhonchi, wheezing, or rubs  HEART: Regular rate and rhythm; No murmurs, rubs, or gallops  ABDOMEN: Soft, Distended, Abdominal tenderness is present ; Bowel sounds present  NERVOUS SYSTEM:  Alert & Oriented X3,    EXTREMITIES:  2+ Peripheral Pulses, No clubbing, cyanosis, or edema  SKIN: warm dry      LABS:                        14.1   5.81  )-----------( 228      ( 2020 14:35 )             41.8         142  |  108  |  10  ----------------------------<  104<H>  3.8   |  28  |  0.54    Ca    9.1      2020 14:35    TPro  7.7  /  Alb  3.6  /  TBili  0.4  /  DBili  x   /  AST  29  /  ALT  44  /  AlkPhos  78        Urinalysis Basic - ( 2020 14:35 )    Color: Yellow / Appearance: Clear / S.010 / pH: x  Gluc: x / Ketone: Negative  / Bili: Negative / Urobili: Negative   Blood: x / Protein: Negative / Nitrite: Negative   Leuk Esterase: Negative / RBC: x / WBC x   Sq Epi: x / Non Sq Epi: x / Bacteria: x      LIVER FUNCTIONS - ( 2020 14:35 )  Alb: 3.6 g/dL / Pro: 7.7 g/dL / ALK PHOS: 78 U/L / ALT: 44 U/L DA / AST: 29 U/L / GGT: x           Lipase, Serum: 130 U/L (20 @ 14:35)    < from: CT Abdomen and Pelvis w/ Oral Cont and w/ IV Cont (20 @ 18:27) >  IMPRESSION:     Cholelithiasis. No CT evidence of acute cholecystitis. If cholecystitis is of clinical concern, recommend repeat abdominal ultrasound.    Hepatomegaly and hepatic steatosis.    Small umbilical hernia containing unobstructed small bowel loops.    Mild left hydronephrosis. No calculi. No evidence of high-grade obstruction.    Diffuse colonic diverticulosis without diverticulitis.    Prominent uterus with probable myomata. Correlate with ultrasound.    < end of copied text >    < from: US Abdomen Complete (20 @ 09:46) >      IMPRESSION:   Cholelithiasis. No ultrasound evidence of acute cholecystitis.    Mild left hydronephrosis.    Mild hepatomegaly and hepatic steatosis which limits evaluation for underlying mass.    < end of copied text >

## 2020-07-01 NOTE — PROGRESS NOTE ADULT - ATTENDING COMMENTS
74 yo woman that was originally scheduled for elective laparoscopic cholecystectomy for symptomatic cholelithiasis, now presented with lower abdominal pain. CT scan shows a small hernia defect near the umbilicus, containing small bowel and likely omentum, no obstruction or strangulation.   The hernia is not palpable on physical exam due to it's small size and the patient's obesity. She also reports some vaginal bleeding and the CT shows an abnormal uterus.     Plan:   Laparoscopic ventral hernia repair with mesh tomorrow  Will delay cholecystectomy, since the hernia the cause of the acute symptoms and combined hernia repair / cholecystectomy carries the risk of mesh infection.   Regular diet today, NPO after midnight for surgery  Medical pre-op eval appreciated  GYN consult - their recommendation is to follow up as outpatient

## 2020-07-02 LAB
ABO RH CONFIRMATION: SIGNIFICANT CHANGE UP
ALBUMIN SERPL ELPH-MCNC: 3 G/DL — LOW (ref 3.5–5)
ALP SERPL-CCNC: 58 U/L — SIGNIFICANT CHANGE UP (ref 40–120)
ALT FLD-CCNC: 33 U/L DA — SIGNIFICANT CHANGE UP (ref 10–60)
ANION GAP SERPL CALC-SCNC: 8 MMOL/L — SIGNIFICANT CHANGE UP (ref 5–17)
APTT BLD: 41 SEC — HIGH (ref 27.5–35.5)
AST SERPL-CCNC: 24 U/L — SIGNIFICANT CHANGE UP (ref 10–40)
BILIRUB SERPL-MCNC: 0.4 MG/DL — SIGNIFICANT CHANGE UP (ref 0.2–1.2)
BLD GP AB SCN SERPL QL: SIGNIFICANT CHANGE UP
BUN SERPL-MCNC: 13 MG/DL — SIGNIFICANT CHANGE UP (ref 7–18)
CALCIUM SERPL-MCNC: 8.4 MG/DL — SIGNIFICANT CHANGE UP (ref 8.4–10.5)
CHLORIDE SERPL-SCNC: 109 MMOL/L — HIGH (ref 96–108)
CO2 SERPL-SCNC: 26 MMOL/L — SIGNIFICANT CHANGE UP (ref 22–31)
CREAT SERPL-MCNC: 0.48 MG/DL — LOW (ref 0.5–1.3)
GLUCOSE SERPL-MCNC: 95 MG/DL — SIGNIFICANT CHANGE UP (ref 70–99)
HCT VFR BLD CALC: 36.5 % — SIGNIFICANT CHANGE UP (ref 34.5–45)
HGB BLD-MCNC: 12.5 G/DL — SIGNIFICANT CHANGE UP (ref 11.5–15.5)
INR BLD: 1.12 RATIO — SIGNIFICANT CHANGE UP (ref 0.88–1.16)
MCHC RBC-ENTMCNC: 30.9 PG — SIGNIFICANT CHANGE UP (ref 27–34)
MCHC RBC-ENTMCNC: 34.2 GM/DL — SIGNIFICANT CHANGE UP (ref 32–36)
MCV RBC AUTO: 90.1 FL — SIGNIFICANT CHANGE UP (ref 80–100)
NRBC # BLD: 0 /100 WBCS — SIGNIFICANT CHANGE UP (ref 0–0)
PLATELET # BLD AUTO: 192 K/UL — SIGNIFICANT CHANGE UP (ref 150–400)
POTASSIUM SERPL-MCNC: 3.7 MMOL/L — SIGNIFICANT CHANGE UP (ref 3.5–5.3)
POTASSIUM SERPL-SCNC: 3.7 MMOL/L — SIGNIFICANT CHANGE UP (ref 3.5–5.3)
PROT SERPL-MCNC: 6.5 G/DL — SIGNIFICANT CHANGE UP (ref 6–8.3)
PROTHROM AB SERPL-ACNC: 13 SEC — SIGNIFICANT CHANGE UP (ref 10.6–13.6)
RBC # BLD: 4.05 M/UL — SIGNIFICANT CHANGE UP (ref 3.8–5.2)
RBC # FLD: 13.2 % — SIGNIFICANT CHANGE UP (ref 10.3–14.5)
SODIUM SERPL-SCNC: 143 MMOL/L — SIGNIFICANT CHANGE UP (ref 135–145)
WBC # BLD: 5.65 K/UL — SIGNIFICANT CHANGE UP (ref 3.8–10.5)
WBC # FLD AUTO: 5.65 K/UL — SIGNIFICANT CHANGE UP (ref 3.8–10.5)

## 2020-07-02 PROCEDURE — 49652: CPT

## 2020-07-02 RX ORDER — HYDROMORPHONE HYDROCHLORIDE 2 MG/ML
1 INJECTION INTRAMUSCULAR; INTRAVENOUS; SUBCUTANEOUS
Refills: 0 | Status: DISCONTINUED | OUTPATIENT
Start: 2020-07-02 | End: 2020-07-02

## 2020-07-02 RX ORDER — SUCRALFATE 1 G
1 TABLET ORAL EVERY 6 HOURS
Refills: 0 | Status: DISCONTINUED | OUTPATIENT
Start: 2020-07-02 | End: 2020-07-03

## 2020-07-02 RX ORDER — SODIUM CHLORIDE 9 MG/ML
1000 INJECTION, SOLUTION INTRAVENOUS
Refills: 0 | Status: DISCONTINUED | OUTPATIENT
Start: 2020-07-02 | End: 2020-07-02

## 2020-07-02 RX ORDER — LOSARTAN POTASSIUM 100 MG/1
100 TABLET, FILM COATED ORAL DAILY
Refills: 0 | Status: DISCONTINUED | OUTPATIENT
Start: 2020-07-02 | End: 2020-07-03

## 2020-07-02 RX ORDER — ONDANSETRON 8 MG/1
4 TABLET, FILM COATED ORAL EVERY 6 HOURS
Refills: 0 | Status: DISCONTINUED | OUTPATIENT
Start: 2020-07-02 | End: 2020-07-03

## 2020-07-02 RX ORDER — BUDESONIDE AND FORMOTEROL FUMARATE DIHYDRATE 160; 4.5 UG/1; UG/1
2 AEROSOL RESPIRATORY (INHALATION)
Refills: 0 | Status: DISCONTINUED | OUTPATIENT
Start: 2020-07-02 | End: 2020-07-03

## 2020-07-02 RX ORDER — ACETAMINOPHEN 500 MG
1000 TABLET ORAL ONCE
Refills: 0 | Status: COMPLETED | OUTPATIENT
Start: 2020-07-02 | End: 2020-07-03

## 2020-07-02 RX ORDER — DEXTROSE MONOHYDRATE, SODIUM CHLORIDE, AND POTASSIUM CHLORIDE 50; .745; 4.5 G/1000ML; G/1000ML; G/1000ML
1000 INJECTION, SOLUTION INTRAVENOUS
Refills: 0 | Status: DISCONTINUED | OUTPATIENT
Start: 2020-07-02 | End: 2020-07-03

## 2020-07-02 RX ORDER — LORATADINE 10 MG/1
10 TABLET ORAL DAILY
Refills: 0 | Status: DISCONTINUED | OUTPATIENT
Start: 2020-07-02 | End: 2020-07-03

## 2020-07-02 RX ORDER — KETOROLAC TROMETHAMINE 30 MG/ML
15 SYRINGE (ML) INJECTION EVERY 6 HOURS
Refills: 0 | Status: DISCONTINUED | OUTPATIENT
Start: 2020-07-02 | End: 2020-07-03

## 2020-07-02 RX ORDER — LATANOPROST 0.05 MG/ML
1 SOLUTION/ DROPS OPHTHALMIC; TOPICAL AT BEDTIME
Refills: 0 | Status: DISCONTINUED | OUTPATIENT
Start: 2020-07-02 | End: 2020-07-03

## 2020-07-02 RX ORDER — FAMOTIDINE 10 MG/ML
20 INJECTION INTRAVENOUS
Refills: 0 | Status: DISCONTINUED | OUTPATIENT
Start: 2020-07-02 | End: 2020-07-03

## 2020-07-02 RX ORDER — HEPARIN SODIUM 5000 [USP'U]/ML
5000 INJECTION INTRAVENOUS; SUBCUTANEOUS EVERY 8 HOURS
Refills: 0 | Status: DISCONTINUED | OUTPATIENT
Start: 2020-07-02 | End: 2020-07-03

## 2020-07-02 RX ORDER — ERGOCALCIFEROL 1.25 MG/1
50000 CAPSULE ORAL
Refills: 0 | Status: DISCONTINUED | OUTPATIENT
Start: 2020-07-02 | End: 2020-07-03

## 2020-07-02 RX ORDER — HYDROMORPHONE HYDROCHLORIDE 2 MG/ML
0.5 INJECTION INTRAMUSCULAR; INTRAVENOUS; SUBCUTANEOUS
Refills: 0 | Status: DISCONTINUED | OUTPATIENT
Start: 2020-07-02 | End: 2020-07-02

## 2020-07-02 RX ORDER — AMLODIPINE BESYLATE 2.5 MG/1
5 TABLET ORAL DAILY
Refills: 0 | Status: DISCONTINUED | OUTPATIENT
Start: 2020-07-02 | End: 2020-07-03

## 2020-07-02 RX ORDER — ONDANSETRON 8 MG/1
4 TABLET, FILM COATED ORAL ONCE
Refills: 0 | Status: DISCONTINUED | OUTPATIENT
Start: 2020-07-02 | End: 2020-07-02

## 2020-07-02 RX ADMIN — CHLORHEXIDINE GLUCONATE 1 APPLICATION(S): 213 SOLUTION TOPICAL at 05:05

## 2020-07-02 RX ADMIN — HYDROMORPHONE HYDROCHLORIDE 0.5 MILLIGRAM(S): 2 INJECTION INTRAMUSCULAR; INTRAVENOUS; SUBCUTANEOUS at 15:52

## 2020-07-02 RX ADMIN — LORATADINE 10 MILLIGRAM(S): 10 TABLET ORAL at 18:00

## 2020-07-02 RX ADMIN — LATANOPROST 1 DROP(S): 0.05 SOLUTION/ DROPS OPHTHALMIC; TOPICAL at 21:08

## 2020-07-02 RX ADMIN — HEPARIN SODIUM 5000 UNIT(S): 5000 INJECTION INTRAVENOUS; SUBCUTANEOUS at 18:00

## 2020-07-02 RX ADMIN — AMLODIPINE BESYLATE 5 MILLIGRAM(S): 2.5 TABLET ORAL at 05:05

## 2020-07-02 RX ADMIN — FAMOTIDINE 20 MILLIGRAM(S): 10 INJECTION INTRAVENOUS at 05:05

## 2020-07-02 RX ADMIN — FAMOTIDINE 20 MILLIGRAM(S): 10 INJECTION INTRAVENOUS at 18:00

## 2020-07-02 RX ADMIN — BUDESONIDE AND FORMOTEROL FUMARATE DIHYDRATE 2 PUFF(S): 160; 4.5 AEROSOL RESPIRATORY (INHALATION) at 21:09

## 2020-07-02 RX ADMIN — LOSARTAN POTASSIUM 100 MILLIGRAM(S): 100 TABLET, FILM COATED ORAL at 05:05

## 2020-07-02 RX ADMIN — HEPARIN SODIUM 5000 UNIT(S): 5000 INJECTION INTRAVENOUS; SUBCUTANEOUS at 21:08

## 2020-07-02 RX ADMIN — Medication 15 MILLIGRAM(S): at 20:34

## 2020-07-02 RX ADMIN — Medication 1 GRAM(S): at 05:05

## 2020-07-02 RX ADMIN — BUDESONIDE AND FORMOTEROL FUMARATE DIHYDRATE 2 PUFF(S): 160; 4.5 AEROSOL RESPIRATORY (INHALATION) at 09:22

## 2020-07-02 NOTE — PROGRESS NOTE ADULT - ASSESSMENT
75y.o. Female with ventral hernia    -OR today  -IVF  -Pain control prn  -DVT ppx
75 yoF with ventral hernia, nonobstructed    - plan for OR lap VHR 7/2  - med clrnce pending  - vaginal bleeding, with incidental finding of myomata, gyn consulted, f/u recs (pt follows Dr. Franco as outpt)  - pre-op mode  - DVT ppx, pain control  - d/w Dr. Jane
75y.o. Female s/p lap ventral hernia repair with mesh    -Due to void  -Clears as tolerated  -IVF  -Pain control prn  -DVT ppx  -Incentive spirometry

## 2020-07-02 NOTE — BRIEF OPERATIVE NOTE - NSICDXBRIEFPROCEDURE_GEN_ALL_CORE_FT
PROCEDURES:  Laparoscopic repair, ventral hernia, incarcerated, including mesh insertion 02-Jul-2020 15:28:28  Rebeca Salcedo

## 2020-07-02 NOTE — PROGRESS NOTE ADULT - SUBJECTIVE AND OBJECTIVE BOX
INTERVAL HPI/OVERNIGHT EVENTS:    No acute events overnight.   Pt resting comfortably. No acute complaints.   Denies n/v    MEDICATIONS  (STANDING):  amLODIPine   Tablet 5 milliGRAM(s) Oral daily  budesonide  80 MICROgram(s)/formoterol 4.5 MICROgram(s) Inhaler 2 Puff(s) Inhalation two times a day  ergocalciferol 77715 Unit(s) Oral <User Schedule>  famotidine    Tablet 20 milliGRAM(s) Oral two times a day  heparin   Injectable 5000 Unit(s) SubCutaneous every 8 hours  latanoprost 0.005% Ophthalmic Solution 1 Drop(s) Both EYES at bedtime  loratadine 10 milliGRAM(s) Oral daily  losartan 100 milliGRAM(s) Oral daily  sodium chloride 0.9% with potassium chloride 20 mEq/L 1000 milliLiter(s) (125 mL/Hr) IV Continuous <Continuous>  sucralfate 1 Gram(s) Oral every 6 hours    MEDICATIONS  (PRN):  ondansetron Injectable 4 milliGRAM(s) IV Push every 6 hours PRN Nausea and/or Vomiting      Vital Signs Last 24 Hrs  T(C): 37.1 (01 Jul 2020 10:00), Max: 37.1 (01 Jul 2020 10:00)  T(F): 98.7 (01 Jul 2020 10:00), Max: 98.7 (01 Jul 2020 10:00)  HR: 88 (01 Jul 2020 10:00) (78 - 107)  BP: 166/75 (01 Jul 2020 10:00) (128/59 - 167/92)  BP(mean): 106 (30 Jun 2020 19:15) (106 - 106)  RR: 16 (01 Jul 2020 10:00) (16 - 20)  SpO2: 95% (01 Jul 2020 10:00) (93% - 98%)      PHYSICAL EXAM  General: Alert and oriented, not in acute distress  Resp: Breathing unlabored  Abdomen: obese, soft, nondistended, nontender, hernia reduced      I&O's Detail      LABS:                        13.1   6.97  )-----------( 203      ( 01 Jul 2020 06:46 )             39.0             07-01    142  |  108  |  7   ----------------------------<  102<H>  3.9   |  28  |  0.49<L>    Ca    8.4      01 Jul 2020 06:46    TPro  6.9  /  Alb  3.3<L>  /  TBili  0.5  /  DBili  x   /  AST  22  /  ALT  39  /  AlkPhos  67  07-01      < from: CT Abdomen and Pelvis w/ Oral Cont and w/ IV Cont (06.30.20 @ 18:27) >  IMPRESSION:     Cholelithiasis. No CT evidence of acute cholecystitis. If cholecystitis is of clinical concern, recommend repeat abdominal ultrasound.    Hepatomegaly and hepatic steatosis.    Small umbilical hernia containing unobstructed small bowel loops.    Mild left hydronephrosis. No calculi. No evidence of high-grade obstruction.    Diffuse colonic diverticulosis without diverticulitis.    Prominent uterus with probable myomata. Correlate with ultrasound.    < end of copied text >
Surgery    Subjective:  Pt resting comfortably.   Tolerating pain with meds.  Tolerating clear liquid diet.  Denies N/V  Has not voided post op but states will get nurse soon to use the restroom.    T(C): 36.4 (07-02-20 @ 17:31), Max: 37.1 (07-01-20 @ 21:53)  HR: 90 (07-02-20 @ 17:31) (73 - 92)  BP: 104/53 (07-02-20 @ 17:31) (104/53 - 154/80)  RR: 18 (07-02-20 @ 17:31) (15 - 20)  SpO2: 98% (07-02-20 @ 17:31) (95% - 100%)    Physical:  Gen: A&O x3  Abd: Soft ND, Dressing C/D/I
Surgery    Subjective:  Pt resting comfortably. No acute complaints  NPO since midnight.    T(C): 36.4 (07-02-20 @ 06:00), Max: 37.1 (07-01-20 @ 10:00)  HR: 73 (07-02-20 @ 06:00) (73 - 88)  BP: 147/75 (07-02-20 @ 06:00) (138/72 - 166/75)  RR: 18 (07-02-20 @ 06:00) (16 - 18)  SpO2: 97% (07-02-20 @ 06:00) (95% - 98%)    Physical:  Gen: A&O x3  Abd: Soft distended superiorly, mildly tender

## 2020-07-03 ENCOUNTER — TRANSCRIPTION ENCOUNTER (OUTPATIENT)
Age: 75
End: 2020-07-03

## 2020-07-03 VITALS
SYSTOLIC BLOOD PRESSURE: 153 MMHG | TEMPERATURE: 98 F | OXYGEN SATURATION: 96 % | DIASTOLIC BLOOD PRESSURE: 76 MMHG | RESPIRATION RATE: 16 BRPM | HEART RATE: 109 BPM

## 2020-07-03 LAB
ANION GAP SERPL CALC-SCNC: 8 MMOL/L — SIGNIFICANT CHANGE UP (ref 5–17)
BASOPHILS # BLD AUTO: 0.01 K/UL — SIGNIFICANT CHANGE UP (ref 0–0.2)
BASOPHILS NFR BLD AUTO: 0.2 % — SIGNIFICANT CHANGE UP (ref 0–2)
BUN SERPL-MCNC: 8 MG/DL — SIGNIFICANT CHANGE UP (ref 7–18)
CALCIUM SERPL-MCNC: 8.3 MG/DL — LOW (ref 8.4–10.5)
CHLORIDE SERPL-SCNC: 108 MMOL/L — SIGNIFICANT CHANGE UP (ref 96–108)
CO2 SERPL-SCNC: 28 MMOL/L — SIGNIFICANT CHANGE UP (ref 22–31)
CREAT SERPL-MCNC: 0.46 MG/DL — LOW (ref 0.5–1.3)
EOSINOPHIL # BLD AUTO: 0.18 K/UL — SIGNIFICANT CHANGE UP (ref 0–0.5)
EOSINOPHIL NFR BLD AUTO: 2.7 % — SIGNIFICANT CHANGE UP (ref 0–6)
GLUCOSE SERPL-MCNC: 100 MG/DL — HIGH (ref 70–99)
HCT VFR BLD CALC: 33.4 % — LOW (ref 34.5–45)
HGB BLD-MCNC: 11.1 G/DL — LOW (ref 11.5–15.5)
IMM GRANULOCYTES NFR BLD AUTO: 0.3 % — SIGNIFICANT CHANGE UP (ref 0–1.5)
LYMPHOCYTES # BLD AUTO: 1.89 K/UL — SIGNIFICANT CHANGE UP (ref 1–3.3)
LYMPHOCYTES # BLD AUTO: 28.5 % — SIGNIFICANT CHANGE UP (ref 13–44)
MCHC RBC-ENTMCNC: 30.3 PG — SIGNIFICANT CHANGE UP (ref 27–34)
MCHC RBC-ENTMCNC: 33.2 GM/DL — SIGNIFICANT CHANGE UP (ref 32–36)
MCV RBC AUTO: 91.3 FL — SIGNIFICANT CHANGE UP (ref 80–100)
MONOCYTES # BLD AUTO: 0.57 K/UL — SIGNIFICANT CHANGE UP (ref 0–0.9)
MONOCYTES NFR BLD AUTO: 8.6 % — SIGNIFICANT CHANGE UP (ref 2–14)
NEUTROPHILS # BLD AUTO: 3.95 K/UL — SIGNIFICANT CHANGE UP (ref 1.8–7.4)
NEUTROPHILS NFR BLD AUTO: 59.7 % — SIGNIFICANT CHANGE UP (ref 43–77)
NRBC # BLD: 0 /100 WBCS — SIGNIFICANT CHANGE UP (ref 0–0)
PLATELET # BLD AUTO: 180 K/UL — SIGNIFICANT CHANGE UP (ref 150–400)
POTASSIUM SERPL-MCNC: 3.5 MMOL/L — SIGNIFICANT CHANGE UP (ref 3.5–5.3)
POTASSIUM SERPL-SCNC: 3.5 MMOL/L — SIGNIFICANT CHANGE UP (ref 3.5–5.3)
RBC # BLD: 3.66 M/UL — LOW (ref 3.8–5.2)
RBC # FLD: 13.5 % — SIGNIFICANT CHANGE UP (ref 10.3–14.5)
SODIUM SERPL-SCNC: 144 MMOL/L — SIGNIFICANT CHANGE UP (ref 135–145)
WBC # BLD: 6.62 K/UL — SIGNIFICANT CHANGE UP (ref 3.8–10.5)
WBC # FLD AUTO: 6.62 K/UL — SIGNIFICANT CHANGE UP (ref 3.8–10.5)

## 2020-07-03 PROCEDURE — C1781: CPT

## 2020-07-03 PROCEDURE — 93005 ELECTROCARDIOGRAM TRACING: CPT

## 2020-07-03 PROCEDURE — 36415 COLL VENOUS BLD VENIPUNCTURE: CPT

## 2020-07-03 PROCEDURE — C1889: CPT

## 2020-07-03 PROCEDURE — 86900 BLOOD TYPING SEROLOGIC ABO: CPT

## 2020-07-03 PROCEDURE — 80053 COMPREHEN METABOLIC PANEL: CPT

## 2020-07-03 PROCEDURE — 74177 CT ABD & PELVIS W/CONTRAST: CPT

## 2020-07-03 PROCEDURE — 81003 URINALYSIS AUTO W/O SCOPE: CPT

## 2020-07-03 PROCEDURE — 83690 ASSAY OF LIPASE: CPT

## 2020-07-03 PROCEDURE — 71045 X-RAY EXAM CHEST 1 VIEW: CPT

## 2020-07-03 PROCEDURE — 86803 HEPATITIS C AB TEST: CPT

## 2020-07-03 PROCEDURE — 86901 BLOOD TYPING SEROLOGIC RH(D): CPT

## 2020-07-03 PROCEDURE — 94640 AIRWAY INHALATION TREATMENT: CPT

## 2020-07-03 PROCEDURE — 85730 THROMBOPLASTIN TIME PARTIAL: CPT

## 2020-07-03 PROCEDURE — 84484 ASSAY OF TROPONIN QUANT: CPT

## 2020-07-03 PROCEDURE — 86850 RBC ANTIBODY SCREEN: CPT

## 2020-07-03 PROCEDURE — 85610 PROTHROMBIN TIME: CPT

## 2020-07-03 PROCEDURE — 80048 BASIC METABOLIC PNL TOTAL CA: CPT

## 2020-07-03 PROCEDURE — 99285 EMERGENCY DEPT VISIT HI MDM: CPT

## 2020-07-03 PROCEDURE — U0003: CPT

## 2020-07-03 PROCEDURE — 85027 COMPLETE CBC AUTOMATED: CPT

## 2020-07-03 RX ORDER — ACETAMINOPHEN 500 MG
2 TABLET ORAL
Qty: 40 | Refills: 0
Start: 2020-07-03 | End: 2020-07-07

## 2020-07-03 RX ORDER — KETOROLAC TROMETHAMINE 30 MG/ML
1 SYRINGE (ML) INJECTION
Qty: 20 | Refills: 0
Start: 2020-07-03 | End: 2020-07-07

## 2020-07-03 RX ADMIN — LORATADINE 10 MILLIGRAM(S): 10 TABLET ORAL at 11:05

## 2020-07-03 RX ADMIN — HEPARIN SODIUM 5000 UNIT(S): 5000 INJECTION INTRAVENOUS; SUBCUTANEOUS at 05:45

## 2020-07-03 RX ADMIN — Medication 400 MILLIGRAM(S): at 02:28

## 2020-07-03 RX ADMIN — Medication 15 MILLIGRAM(S): at 08:34

## 2020-07-03 RX ADMIN — Medication 1 GRAM(S): at 05:46

## 2020-07-03 RX ADMIN — Medication 1 GRAM(S): at 11:05

## 2020-07-03 RX ADMIN — LOSARTAN POTASSIUM 100 MILLIGRAM(S): 100 TABLET, FILM COATED ORAL at 05:45

## 2020-07-03 RX ADMIN — BUDESONIDE AND FORMOTEROL FUMARATE DIHYDRATE 2 PUFF(S): 160; 4.5 AEROSOL RESPIRATORY (INHALATION) at 09:36

## 2020-07-03 RX ADMIN — FAMOTIDINE 20 MILLIGRAM(S): 10 INJECTION INTRAVENOUS at 05:45

## 2020-07-03 RX ADMIN — AMLODIPINE BESYLATE 5 MILLIGRAM(S): 2.5 TABLET ORAL at 05:45

## 2020-07-03 NOTE — DISCHARGE NOTE PROVIDER - HOSPITAL COURSE
75 female PMHx HTN, Asthma, glaucoma, Cholelithiasis, c/o 2weeks hx abdominal pain. Pt indicated lower abdomen radiating to upper abdomen. Pain cramping and intermittent, not associated with any particular activity. No n/v. No change in BM. No fever reported. No hematemesis, no dysuria or hematuria. No hx jaundice. No cp or sob.    Pt is presently drinking contrast for CT ordered by ER    No covid contacts reported.             Pt had CT scan showing < from: CT Abdomen and Pelvis w/ Oral Cont and w/ IV Cont (06.30.20 @ 18:27) >        Cholelithiasis. No CT evidence of acute cholecystitis. If cholecystitis is of clinical concern, recommend repeat abdominal ultrasound.        Hepatomegaly and hepatic steatosis.        Small umbilical hernia containing unobstructed small bowel loops.        < end of copied text >        Pt underwent laparoscopic ventral hernia repair on 7/2. Postoperative course was uncomplicated. Pt tolerated regular diet and is stable for discharge.

## 2020-07-03 NOTE — DISCHARGE NOTE PROVIDER - NSDCMRMEDTOKEN_GEN_ALL_CORE_FT
Carafate 1 g oral tablet: 1 tab(s) orally every 6 hours as needed for pain  famotidine 20 mg oral tablet: 1 tab(s) orally 2 times a day for abdominal pain   irbesartan 300 mg oral tablet: 1 tab(s) orally once a day  ketorolac 10 mg oral tablet: 1 tab(s) orally 4 times a day, As Needed -for moderate pain MDD:40mg   latanoprost 0.005% ophthalmic solution: 1 drop(s) to each affected eye once a day (in the evening)  levocetirizine 5 mg oral tablet: 1 tab(s) orally once a day (in the evening)  Symbicort:   Vitamin D2: Carafate 1 g oral tablet: 1 tab(s) orally every 6 hours as needed for pain  famotidine 20 mg oral tablet: 1 tab(s) orally 2 times a day for abdominal pain   irbesartan 300 mg oral tablet: 1 tab(s) orally once a day  ketorolac 10 mg oral tablet: 1 tab(s) orally 4 times a day, As Needed -for moderate pain MDD:40mg   latanoprost 0.005% ophthalmic solution: 1 drop(s) to each affected eye once a day (in the evening)  levocetirizine 5 mg oral tablet: 1 tab(s) orally once a day (in the evening)  Symbicort:   Tylenol Extra Strength 500 mg oral tablet: 2 tab(s) orally every 6 hours, As Needed -for mild pain   Vitamin D2:

## 2020-07-03 NOTE — DISCHARGE NOTE PROVIDER - NSDCCPTREATMENT_GEN_ALL_CORE_FT
PRINCIPAL PROCEDURE  Procedure: Laparoscopic repair, ventral hernia, incarcerated, including mesh insertion  Findings and Treatment:

## 2020-07-03 NOTE — DISCHARGE NOTE PROVIDER - CARE PROVIDER_API CALL
Laci Jane (MD)  Surgery  [address] [facility], [place] [address]  Phone: [phone]  Fax: [phone]  Follow Up Time: 1 week

## 2020-07-03 NOTE — DISCHARGE NOTE PROVIDER - NSDCCPCAREPLAN_GEN_ALL_CORE_FT
PRINCIPAL DISCHARGE DIAGNOSIS  Diagnosis: Right lower quadrant abdominal pain  Assessment and Plan of Treatment:

## 2020-07-14 ENCOUNTER — APPOINTMENT (OUTPATIENT)
Dept: SURGERY | Facility: CLINIC | Age: 75
End: 2020-07-14
Payer: MEDICARE

## 2020-07-14 VITALS
TEMPERATURE: 97.2 F | WEIGHT: 175 LBS | HEIGHT: 56 IN | BODY MASS INDEX: 39.37 KG/M2 | SYSTOLIC BLOOD PRESSURE: 179 MMHG | DIASTOLIC BLOOD PRESSURE: 85 MMHG | HEART RATE: 9 BPM

## 2020-07-14 PROCEDURE — 99024 POSTOP FOLLOW-UP VISIT: CPT

## 2020-07-14 NOTE — HISTORY OF PRESENT ILLNESS
[de-identified] : JUSTUS RODRIGUEZ is a 75 year old woman who presents in the office for postop visit. Patient s/p Laparoscopic incarcerated ventral hernia repair with mesh on 07/02/2020. The umbilicus is draining serosanguineous fluid, and she has been changing the dressing ~ 2 times a day. Starting yesterday, the amount of drainage has increased, and she changes her dressing 3 to 4 times. Patient has right periumbilical ecchymosis with general abdominal bruising. Patient reports some pain in left lower back. Dressing was changed in the office and MARISOL dressing applied. Patient denies any fever chills, nausea,vomiting.

## 2020-07-14 NOTE — REVIEW OF SYSTEMS
[As Noted in HPI] : as noted in HPI [Negative] : Heme/Lymph [Fever] : no fever [Chills] : no chills [FreeTextEntry9] : Weakness

## 2020-07-14 NOTE — ASSESSMENT
[FreeTextEntry1] : s/p lap ventral hernia repair  - suture repair of 2 small defects and a wide diastasis recti, followed by ProGrip mesh placed in the retro-rectus position. No sign of recurrence and the pain she experienced preop as a result of the hernia is gone. She has normal ambulation and eating. \par \par Postop course is complicated by hematoma - likely in the rectus sheath, considering the bilateral lower abdominal bruising and the palpable hematoma just off midline on the right. There is no clinical signs of active bleeding, but the hematoma is spontaneously draining from the base of the umbilicus. She is on Augmentin since Friday in order to prevent a mesh infection from the open draining wound. \par \par Plan is local wound care - will attempt MARISOL vac, continue Abx, f/u next week.

## 2020-07-14 NOTE — PHYSICAL EXAM
[Alert] : alert [Oriented to Person] : oriented to person [Oriented to Place] : oriented to place [Oriented to Time] : oriented to time [Calm] : calm [de-identified] : The patient is alert, well-groomed, well developed and cheerful.  [de-identified] : Normoactive bowel sounds, soft and nontender, no hepatosplenomegaly or masses noted, Patient  right periumbilical ecchymosis with general abdominal bruising and moderate amount of serosanguineous drainage on umbilical incision site

## 2020-07-14 NOTE — PLAN
[FreeTextEntry1] : Please follow up at the office next week and as needed for any questions or concerns

## 2020-07-21 ENCOUNTER — APPOINTMENT (OUTPATIENT)
Dept: SURGERY | Facility: CLINIC | Age: 75
End: 2020-07-21
Payer: MEDICARE

## 2020-07-21 VITALS
HEIGHT: 56 IN | BODY MASS INDEX: 40.04 KG/M2 | DIASTOLIC BLOOD PRESSURE: 82 MMHG | WEIGHT: 178 LBS | HEART RATE: 89 BPM | TEMPERATURE: 97.8 F | SYSTOLIC BLOOD PRESSURE: 151 MMHG

## 2020-07-21 DIAGNOSIS — K80.20 CALCULUS OF GALLBLADDER W/OUT CHOLECYSTITIS W/OUT OBSTRUCTION: ICD-10-CM

## 2020-07-21 PROCEDURE — 99024 POSTOP FOLLOW-UP VISIT: CPT

## 2020-07-21 RX ORDER — CEPHALEXIN 500 MG/1
500 CAPSULE ORAL 3 TIMES DAILY
Qty: 21 | Refills: 0 | Status: DISCONTINUED | COMMUNITY
Start: 2020-07-21 | End: 2020-07-21

## 2020-07-23 NOTE — PHYSICAL EXAM
[Alert] : alert [Oriented to Person] : oriented to person [Oriented to Place] : oriented to place [Oriented to Time] : oriented to time [Calm] : calm [de-identified] : The patient is alert, well-groomed, well developed and cheerful.  [de-identified] : Normoactive bowel sounds, soft and nontender, no hepatosplenomegaly or masses noted,  serosanguineous drainage on umbilical incision site.

## 2020-07-23 NOTE — HISTORY OF PRESENT ILLNESS
[de-identified] : JUSTUS RODRIGUEZ is a 75 year old woman who presents in the office for postop visit. Patient s/p Laparoscopic incarcerated ventral hernia repair with mesh on 07/02/2020. Today patient feels well. Patient c/p some pain in the back that got better with pain patch and Alive. Patient umbilicus is draining serosanguineous fluid, and she has been changing the dressing ~ 2 times a day, and she reports it is draining less. Patient denies any fever, chills.  Patient able to tolerate regular diet with normal bowel movements [de-identified] :  \par

## 2020-07-23 NOTE — ASSESSMENT
[FreeTextEntry1] : JUSTUS RODRIGUEZ is a 75 year old female s/p Laparoscopic incarcerated ventral hernia repair with mesh on 07/02/2020  No sign of recurrence and the pain she experienced preop as a result of the hernia is gone. She has normal ambulation and eating. \par \par Postop course is complicated by hematoma - likely in the rectus sheath,. There is no clinical signs of active bleeding, but the hematoma is spontaneously draining from the base of the umbilicus. The drainage is improved and she stated that it is draining much less from last visit. Patient was on Augmentin that was completed and patient started on Keflex for 14 day  to prevent a mesh infection from the open draining wound. \par \par Plan is local wound care continue Antibiotics, f/u 2  week. \par

## 2020-07-23 NOTE — PLAN
[FreeTextEntry1] : Please follow up at the office within 2 weeks  and as needed for any questions or concerns

## 2020-07-28 ENCOUNTER — APPOINTMENT (OUTPATIENT)
Dept: SURGERY | Facility: CLINIC | Age: 75
End: 2020-07-28

## 2020-07-28 NOTE — PRE-OP CHECKLIST - ANTIBIOTIC
[address] [facility] FOR BLOOD OR BLOOD COMPONENTS      In the course of your treatment, it may become necessary to administer a transfusion of blood or blood components. This form provides basic information concerning this procedure and, if signed by you, authorizes its performance by qualified medical personnel. DESCRIPTION OF PROCEDURE:  Blood is introduced into one of your veins, commonly in the arm, using a sterilized disposable needle. The amount of blood transfused, and whether the transfusion will be of blood or blood components is a judgment the physician will make based on your particular needs. RISKS:  The transfusion is a common procedure of low risk. MINOR AND TEMPORARY REACTIONS ARE NOT UNCOMMON, including a slight bruise, swelling or local reaction in the area where the needle pierces your skin, or a non-serious reaction to the transfused material itself, including headache, fever or a mild skin reaction, such as rash. Serious reactions are possible, though very unlikely and include severe allergic reaction (shock)  and destruction (hemolysis) of transfused blood cells. Infectious diseases which are known to be transmitted by blood transfusion include CERTAIN TYPES OF VIRAL HEPATITIS, a viral infection of the liver, HUMAN IMMUNODEFICIENCY VIRUS (HIV-1,2) infection, a viral infection known to cause ACQUIRED IMMUNODEFICIENCY SYNDROME (AIDS) AS WELL AS CERTAIN OTHER BACTERIAL, VIRAL AND PARASITIC DISEASES. While a minimal risk of acquiring an infectious disease from transfused blood exists, in accordance with Federal and State law all due care has been taken in donor selection and testing to avoid transmission of disease. ALTERNATIVES:  If loss of blood poses serious threats in the course of your treatment, THERE IS NO EFFECTIVE ALTERNATIVE TO BLOOD TRANSFUSION.  However, if you have any further questions on this matter, your physician will fully explain the alternatives to you if it has not already been done. Brad Hamilton. Ines Payne, have read/had read to me the above. I understand the matters bearing on the decision whether or not to authorize a transfusion of blood or blood components. I have no questions which have not been answered to my full satisfaction. I hereby consent to such transfusion as  my physician may deem necessary or advisable in the course of my treatment.        _______________   __________________________________________________  Date     Signature of Patient, Parent or Legal Guardian      ([place] [facility])      __________________________________________  Witness to Signature (title or relationship to patient)    Patient Name: Allison Garvin.      : 1944                 Printed: September 15, 2022     Medical Record #: [id number]                    Page 1 of 1 yes Consent (Ear)/Introductory Paragraph: The rationale for Mohs was explained to the patient and consent was obtained. The risks, benefits and alternatives to therapy were discussed in detail. Specifically, the risks of ear deformity, infection, scarring, bleeding, prolonged wound healing, incomplete removal, allergy to anesthesia, nerve injury and recurrence were addressed. Prior to the procedure, the treatment site was clearly identified and confirmed by the patient. All components of Universal Protocol/PAUSE Rule completed.

## 2020-08-04 ENCOUNTER — APPOINTMENT (OUTPATIENT)
Dept: SURGERY | Facility: CLINIC | Age: 75
End: 2020-08-04
Payer: MEDICARE

## 2020-08-04 VITALS
TEMPERATURE: 97.1 F | SYSTOLIC BLOOD PRESSURE: 153 MMHG | BODY MASS INDEX: 39.23 KG/M2 | WEIGHT: 174.4 LBS | HEIGHT: 56 IN | DIASTOLIC BLOOD PRESSURE: 76 MMHG | HEART RATE: 91 BPM

## 2020-08-04 PROCEDURE — 99024 POSTOP FOLLOW-UP VISIT: CPT

## 2020-08-04 RX ORDER — CEPHALEXIN 500 MG/1
500 CAPSULE ORAL 3 TIMES DAILY
Qty: 42 | Refills: 0 | Status: COMPLETED | COMMUNITY
Start: 2020-07-21 | End: 2020-08-04

## 2020-08-04 RX ORDER — CEPHALEXIN 500 MG/1
500 CAPSULE ORAL 3 TIMES DAILY
Qty: 42 | Refills: 0 | Status: ACTIVE | COMMUNITY
Start: 2020-08-04 | End: 1900-01-01

## 2020-08-04 NOTE — ASSESSMENT
[FreeTextEntry1] : JUSTUS RODRIGUEZ is a 75 year old female s/p Laparoscopic incarcerated ventral hernia repair with mesh on 07/02/2020.\par \par Patient doing well, no fever no chills. The drainage is improved and she stated that it is draining much less from last visit. Patient stated that she was applying rubbing alcohol to periumbilical and umbilical aria that cause skin irritation, redness. Continue on Keflex for 14 day to prevent a mesh infection from the open draining wound. \par \par Plan is local wound care continue Antibiotics, f/u 2 week. \par \par

## 2020-08-04 NOTE — HISTORY OF PRESENT ILLNESS
[de-identified] : JUSTUS RODRIGUEZ is a 75 year old woman who presents in the office for postop visit. Patient s/p Laparoscopic incarcerated ventral hernia repair with mesh on 07/02/2020. Today Patient is doing well. Patient umbilicus has some draining with white discoloration in the base of the umbilicus. Patient stated that she was applying rubbing alcohol to periumbilical and umbilical aria that cause skin irritation, redness and skin pilling at the site. Patient denies any fever chills, constipation.

## 2020-08-04 NOTE — PLAN
[FreeTextEntry1] : Please follow up at the office 2 weeks  and as needed for any questions or concerns

## 2020-08-04 NOTE — PHYSICAL EXAM
[Alert] : alert [Oriented to Person] : oriented to person [Oriented to Place] : oriented to place [Oriented to Time] : oriented to time [Calm] : calm [de-identified] : The patient is alert, well-groomed, well developed and cheerful.  [de-identified] : Normoactive bowel sounds, soft and nontender, no hepatosplenomegaly or masses noted,  sanguineous drainage on umbilical incision site.

## 2020-08-18 ENCOUNTER — APPOINTMENT (OUTPATIENT)
Dept: SURGERY | Facility: CLINIC | Age: 75
End: 2020-08-18
Payer: MEDICARE

## 2020-08-18 VITALS
OXYGEN SATURATION: 95 % | HEIGHT: 56 IN | BODY MASS INDEX: 39.37 KG/M2 | WEIGHT: 175 LBS | TEMPERATURE: 98.4 F | HEART RATE: 80 BPM | DIASTOLIC BLOOD PRESSURE: 79 MMHG | SYSTOLIC BLOOD PRESSURE: 172 MMHG

## 2020-08-18 DIAGNOSIS — K43.9 VENTRAL HERNIA W/OUT OBSTRUCTION OR GANGRENE: ICD-10-CM

## 2020-08-18 PROCEDURE — 99024 POSTOP FOLLOW-UP VISIT: CPT

## 2020-08-20 LAB
ANION GAP SERPL CALC-SCNC: 11 MMOL/L
BUN SERPL-MCNC: 13 MG/DL
CALCIUM SERPL-MCNC: 9.6 MG/DL
CHLORIDE SERPL-SCNC: 104 MMOL/L
CO2 SERPL-SCNC: 27 MMOL/L
CREAT SERPL-MCNC: 0.55 MG/DL
GLUCOSE SERPL-MCNC: 106 MG/DL
POTASSIUM SERPL-SCNC: 4 MMOL/L
SODIUM SERPL-SCNC: 143 MMOL/L

## 2020-08-20 NOTE — PHYSICAL EXAM
[Alert] : alert [Oriented to Person] : oriented to person [Oriented to Time] : oriented to time [Oriented to Place] : oriented to place [Calm] : calm [de-identified] : The patient is alert, well-groomed, well developed and cheerful.  [de-identified] : Neck supple. Trachea midline. Thyroid isthmus barely palpable, lobes not felt.  [de-identified] : Breath sounds equal and bilateral, no wheezing no rales or rhonchi  [de-identified] :  good S1, S2, no m/r/g bilateral  [de-identified] : Normoactive bowel sounds, soft and nontender, no hepatosplenomegaly or masses noted,  small sanguineous drainage on umbilical incision site. [de-identified] : WNL

## 2020-08-20 NOTE — ASSESSMENT
[FreeTextEntry1] : JUSTUS RODRIGUEZ is a 75 year old female s/p Laparoscopic incarcerated ventral hernia repair with mesh on 07/02/2020.\par \par Surgery was complicated by hematoma which spontaneously drained from a wound at the base of the umbilicus. She has been on antibiotics to prevent colonization of the mesh. The drainage has decreased to minimal, she is otherwise asymptomatic - a small hematoma is still palpable just to the right of the umbilicus. \par \par Plan: \par \par CT abdomen with IV contrast \par Continue local wound care\par Stop Abx \par \par

## 2020-08-20 NOTE — HISTORY OF PRESENT ILLNESS
[de-identified] : JUSTUS RODRIGUEZ is a 75 year old woman who presents in the office for postop visit. Patient s/p Laparoscopic incarcerated ventral hernia repair with mesh on 07/02/2020.Today Patient is doing well. Patient umbilicus has small amount of draining with white discoloration and small loose skin in the base of the umbilicus. Patient denies any fever and chills, able to tolerated regular diet and normal bowel movement. We will order CT abdomen and pelvis\par

## 2020-08-25 ENCOUNTER — APPOINTMENT (OUTPATIENT)
Dept: CT IMAGING | Facility: HOSPITAL | Age: 75
End: 2020-08-25
Payer: MEDICARE

## 2020-08-25 ENCOUNTER — OUTPATIENT (OUTPATIENT)
Dept: OUTPATIENT SERVICES | Facility: HOSPITAL | Age: 75
LOS: 1 days | End: 2020-08-25
Payer: MEDICARE

## 2020-08-25 DIAGNOSIS — K43.9 VENTRAL HERNIA WITHOUT OBSTRUCTION OR GANGRENE: ICD-10-CM

## 2020-08-25 PROCEDURE — 74177 CT ABD & PELVIS W/CONTRAST: CPT | Mod: 26

## 2020-08-25 PROCEDURE — 74177 CT ABD & PELVIS W/CONTRAST: CPT

## 2021-03-01 ENCOUNTER — EMERGENCY (EMERGENCY)
Facility: HOSPITAL | Age: 76
LOS: 1 days | End: 2021-03-01
Admitting: EMERGENCY MEDICINE
Payer: MEDICARE

## 2021-03-01 PROCEDURE — ZZZZZ: CPT

## 2021-03-02 ENCOUNTER — TRANSCRIPTION ENCOUNTER (OUTPATIENT)
Age: 76
End: 2021-03-02

## 2021-03-02 ENCOUNTER — APPOINTMENT (OUTPATIENT)
Dept: SURGERY | Facility: CLINIC | Age: 76
End: 2021-03-02

## 2021-03-02 PROCEDURE — 36415 COLL VENOUS BLD VENIPUNCTURE: CPT

## 2021-03-02 PROCEDURE — 85025 COMPLETE CBC W/AUTO DIFF WBC: CPT

## 2021-03-02 PROCEDURE — 99284 EMERGENCY DEPT VISIT MOD MDM: CPT | Mod: 25

## 2021-03-02 PROCEDURE — 82553 CREATINE MB FRACTION: CPT

## 2021-03-02 PROCEDURE — 80076 HEPATIC FUNCTION PANEL: CPT

## 2021-03-02 PROCEDURE — 74177 CT ABD & PELVIS W/CONTRAST: CPT

## 2021-03-02 PROCEDURE — 80048 BASIC METABOLIC PNL TOTAL CA: CPT

## 2021-03-02 PROCEDURE — U0005: CPT

## 2021-03-02 PROCEDURE — 82550 ASSAY OF CK (CPK): CPT

## 2021-03-02 PROCEDURE — 94640 AIRWAY INHALATION TREATMENT: CPT

## 2021-03-02 PROCEDURE — 83690 ASSAY OF LIPASE: CPT

## 2021-03-02 PROCEDURE — 80053 COMPREHEN METABOLIC PANEL: CPT

## 2021-03-02 PROCEDURE — A9537: CPT

## 2021-03-02 PROCEDURE — 85027 COMPLETE CBC AUTOMATED: CPT

## 2021-03-02 PROCEDURE — 81001 URINALYSIS AUTO W/SCOPE: CPT

## 2021-03-02 PROCEDURE — 84484 ASSAY OF TROPONIN QUANT: CPT

## 2021-03-02 PROCEDURE — 87635 SARS-COV-2 COVID-19 AMP PRB: CPT

## 2021-03-02 PROCEDURE — 78227 HEPATOBIL SYST IMAGE W/DRUG: CPT

## 2021-07-23 ENCOUNTER — APPOINTMENT (OUTPATIENT)
Dept: ORTHOPEDIC SURGERY | Facility: CLINIC | Age: 76
End: 2021-07-23
Payer: MEDICARE

## 2021-07-23 VITALS
SYSTOLIC BLOOD PRESSURE: 180 MMHG | BODY MASS INDEX: 36.29 KG/M2 | DIASTOLIC BLOOD PRESSURE: 88 MMHG | HEART RATE: 85 BPM | WEIGHT: 180 LBS | HEIGHT: 59 IN

## 2021-07-23 DIAGNOSIS — Z78.9 OTHER SPECIFIED HEALTH STATUS: ICD-10-CM

## 2021-07-23 DIAGNOSIS — Z86.69 PERSONAL HISTORY OF OTHER DISEASES OF THE NERVOUS SYSTEM AND SENSE ORGANS: ICD-10-CM

## 2021-07-23 DIAGNOSIS — Z86.79 PERSONAL HISTORY OF OTHER DISEASES OF THE CIRCULATORY SYSTEM: ICD-10-CM

## 2021-07-23 PROCEDURE — 72100 X-RAY EXAM L-S SPINE 2/3 VWS: CPT | Mod: 59

## 2021-07-23 PROCEDURE — 72082 X-RAY EXAM ENTIRE SPI 2/3 VW: CPT

## 2021-07-23 PROCEDURE — 99203 OFFICE O/P NEW LOW 30 MIN: CPT

## 2021-07-23 RX ORDER — CHOLECALCIFEROL (VITAMIN D3) 125 MCG
TABLET ORAL
Refills: 0 | Status: ACTIVE | COMMUNITY

## 2021-07-23 RX ORDER — NAPROXEN 500 MG/1
TABLET ORAL
Refills: 0 | Status: ACTIVE | COMMUNITY

## 2021-07-23 RX ORDER — TIZANIDINE 2 MG/1
2 TABLET ORAL EVERY 6 HOURS
Qty: 24 | Refills: 1 | Status: ACTIVE | COMMUNITY
Start: 2021-07-23 | End: 1900-01-01

## 2021-07-23 RX ORDER — FEXOFENADINE HYDROCHLORIDE 60 MG/1
TABLET, FILM COATED ORAL
Refills: 0 | Status: ACTIVE | COMMUNITY

## 2021-07-23 RX ORDER — TAFLUPROST 0.01 MG/ML
0 SOLUTION/ DROPS OPHTHALMIC
Refills: 0 | Status: ACTIVE | COMMUNITY

## 2021-07-23 RX ORDER — PARSLEY 450 MG
CAPSULE ORAL
Refills: 0 | Status: ACTIVE | COMMUNITY

## 2021-07-23 RX ORDER — TRIAMCINOLONE ACETONIDE 1 MG/G
0.1 CREAM TOPICAL
Refills: 0 | Status: ACTIVE | COMMUNITY

## 2021-07-23 RX ORDER — CETIRIZINE 2.4 MG/ML
0.24 SOLUTION/ DROPS OPHTHALMIC
Refills: 0 | Status: ACTIVE | COMMUNITY

## 2021-07-23 RX ORDER — CARBOXYMETHYLCELLULOSE SODIUM 10 MG/ML
GEL OPHTHALMIC
Refills: 0 | Status: ACTIVE | COMMUNITY

## 2021-07-23 RX ORDER — ROSUVASTATIN CALCIUM 10 MG/1
10 TABLET, FILM COATED ORAL
Refills: 0 | Status: ACTIVE | COMMUNITY

## 2021-07-23 NOTE — PHYSICAL EXAM
[de-identified] : Lumbar Physical Exam\par \par Gait - Normal\par \par Station - Normal\par \par Sagittal balance - Normal\par \par Compensatory mechanism? - None\par \par Heel walk - Normal\par \par Toe walk - Normal\par \par Reflexes\par Patellar - normal\par Gastroc - normal\par Clonus - No\par \par Hip Exam - Normal\par \par Straight leg raise - none\par \par Pulses - 2+ dp/pt\par \par Range of motion - normal\par \par Sensation \par Sensation intact to light touch in L1, L2, L3, L4, L5 and S1 dermatomes bilaterally\par \par Motor\par 	IP	Quad	HS	TA	Gastroc	EHL\par Right	4/5	5/5	5/5	5/5	5/5	5/5\par Left	4/5	5/5	5/5	5/5	5/5	5/5 [de-identified] : Scoliosis radiographs\par SVA within normal limits for patient of her age\par Lumbar lordosis and pelvic incidence roughly equal\par \par Lumbar radiographs\par No obvious instability on flexion-extension views

## 2021-07-23 NOTE — HISTORY OF PRESENT ILLNESS
[de-identified] : This is a 76-year-old female here today for evaluation of her severe back and lower extremity pain.  She has pain that radiates down her posterior lateral thigh posterior lateral calf.  Unfortunately the symptoms have been worsening over the past several months.  She describes pain which does interfere with her ability to walk.  She can not walk even a block at this point.  She does feel slightly better when she does lean forward.  She denies any bowel bladder issues.  She denies any saddle anesthesia.

## 2021-07-26 RX ORDER — NAPROXEN SODIUM 550 MG/1
550 TABLET ORAL
Qty: 24 | Refills: 0 | Status: ACTIVE | COMMUNITY
Start: 2021-07-26 | End: 1900-01-01

## 2021-07-28 ENCOUNTER — EMERGENCY (EMERGENCY)
Facility: HOSPITAL | Age: 76
LOS: 1 days | Discharge: HOME | End: 2021-07-28
Attending: STUDENT IN AN ORGANIZED HEALTH CARE EDUCATION/TRAINING PROGRAM
Payer: MEDICARE

## 2021-07-28 VITALS
HEIGHT: 56 IN | TEMPERATURE: 98 F | HEART RATE: 80 BPM | DIASTOLIC BLOOD PRESSURE: 94 MMHG | OXYGEN SATURATION: 96 % | RESPIRATION RATE: 20 BRPM | SYSTOLIC BLOOD PRESSURE: 192 MMHG | WEIGHT: 184.97 LBS

## 2021-07-28 VITALS
RESPIRATION RATE: 16 BRPM | DIASTOLIC BLOOD PRESSURE: 74 MMHG | OXYGEN SATURATION: 98 % | SYSTOLIC BLOOD PRESSURE: 146 MMHG | HEART RATE: 84 BPM | TEMPERATURE: 98 F

## 2021-07-28 PROCEDURE — 99283 EMERGENCY DEPT VISIT LOW MDM: CPT | Mod: 25

## 2021-07-28 PROCEDURE — 99284 EMERGENCY DEPT VISIT MOD MDM: CPT

## 2021-07-28 PROCEDURE — 96372 THER/PROPH/DIAG INJ SC/IM: CPT

## 2021-07-28 RX ORDER — IBUPROFEN 200 MG
1 TABLET ORAL
Qty: 20 | Refills: 0
Start: 2021-07-28 | End: 2021-08-01

## 2021-07-28 RX ORDER — ACETAMINOPHEN 500 MG
975 TABLET ORAL ONCE
Refills: 0 | Status: COMPLETED | OUTPATIENT
Start: 2021-07-28 | End: 2021-07-28

## 2021-07-28 RX ORDER — LIDOCAINE 4 G/100G
1 CREAM TOPICAL ONCE
Refills: 0 | Status: COMPLETED | OUTPATIENT
Start: 2021-07-28 | End: 2021-07-28

## 2021-07-28 RX ORDER — CYCLOBENZAPRINE HYDROCHLORIDE 10 MG/1
1 TABLET, FILM COATED ORAL
Qty: 20 | Refills: 0
Start: 2021-07-28 | End: 2021-08-01

## 2021-07-28 RX ORDER — ACETAMINOPHEN 500 MG
2 TABLET ORAL
Qty: 40 | Refills: 0
Start: 2021-07-28 | End: 2021-08-01

## 2021-07-28 RX ORDER — CYCLOBENZAPRINE HYDROCHLORIDE 10 MG/1
10 TABLET, FILM COATED ORAL ONCE
Refills: 0 | Status: COMPLETED | OUTPATIENT
Start: 2021-07-28 | End: 2021-07-28

## 2021-07-28 RX ORDER — DIAZEPAM 5 MG
5 TABLET ORAL ONCE
Refills: 0 | Status: COMPLETED | OUTPATIENT
Start: 2021-07-28 | End: 2021-07-28

## 2021-07-28 RX ORDER — KETOROLAC TROMETHAMINE 30 MG/ML
15 SYRINGE (ML) INJECTION ONCE
Refills: 0 | Status: DISCONTINUED | OUTPATIENT
Start: 2021-07-28 | End: 2021-07-28

## 2021-07-28 RX ADMIN — Medication 15 MILLIGRAM(S): at 15:57

## 2021-07-28 RX ADMIN — Medication 975 MILLIGRAM(S): at 16:30

## 2021-07-28 RX ADMIN — CYCLOBENZAPRINE HYDROCHLORIDE 10 MILLIGRAM(S): 10 TABLET, FILM COATED ORAL at 15:56

## 2021-07-28 RX ADMIN — Medication 975 MILLIGRAM(S): at 15:57

## 2021-07-28 RX ADMIN — Medication 15 MILLIGRAM(S): at 16:30

## 2021-07-28 RX ADMIN — LIDOCAINE 1 PATCH: 4 CREAM TOPICAL at 15:57

## 2021-07-28 NOTE — ED PROVIDER NOTE - PATIENT PORTAL LINK FT
You can access the FollowMyHealth Patient Portal offered by [facility] by registering at the following website: [email or web address]. By joining [employer]’s FollowMyHealth portal, you will also be able to view your health information using other applications (apps) compatible with our system.

## 2021-07-28 NOTE — ED PROVIDER NOTE - CLINICAL SUMMARY MEDICAL DECISION MAKING FREE TEXT BOX
76F presenting with right leg pain. normal sensation, strength decreased 2/2 pain. 1+ right DP pulses. no midline tenderness or trauma. h/o sciatica. likely sciatica vs msk pain. will give analgesia. patient has not taken blood pressure medication today.

## 2021-07-28 NOTE — ED PROVIDER NOTE - PHYSICAL EXAMINATION
General: well appearing female, no acute distress   HEENT: normocephalic, atraumatic   Respiratory: normal work of breathing  MSK: right lower leg tenderness with movement, no midline spinal tenderness, positive right straight leg raise   Skin: warm, dry   Neuro: A&Ox3, 4/5 strength of right lower leg, normal sensation   Psych: appropriate affect

## 2021-07-28 NOTE — ED PROVIDER NOTE - OBJECTIVE STATEMENT
76F, pmh of htn, asthma, presenting with right lower leg pain. patient reports intermittent right leg pain that radiates from her buttock down to her right foot for a year but it has gotten worse over the past 2 days. has history of sciatica. has appointment with orthopedic surgeon next week. no back or leg injuries, numbness, tingling or weakness in her legs. no chest pain or shortness of breath.

## 2021-07-28 NOTE — ED PROVIDER NOTE - NSFOLLOWUPINSTRUCTIONS_ED_ALL_ED_FT
You were seen in the emergency department for right leg pain likely caused by sciatica.     Please follow-up with your primary care doctor in the next 24-48 hours.     Please take Tylenol and Ibuprofen and prescribed on the bottles for pain control.     If you have any worsening symptoms, severe leg or back pain, weakness in your legs, numbness or tingling, please return to the emergency department.

## 2021-08-11 ENCOUNTER — OUTPATIENT (OUTPATIENT)
Dept: OUTPATIENT SERVICES | Facility: HOSPITAL | Age: 76
LOS: 1 days | End: 2021-08-11
Payer: MEDICARE

## 2021-08-11 ENCOUNTER — APPOINTMENT (OUTPATIENT)
Dept: MRI IMAGING | Facility: CLINIC | Age: 76
End: 2021-08-11
Payer: MEDICARE

## 2021-08-11 DIAGNOSIS — M54.30 SCIATICA, UNSPECIFIED SIDE: ICD-10-CM

## 2021-08-11 PROCEDURE — 72148 MRI LUMBAR SPINE W/O DYE: CPT | Mod: 26,MH

## 2021-08-11 PROCEDURE — 72148 MRI LUMBAR SPINE W/O DYE: CPT

## 2021-08-13 ENCOUNTER — APPOINTMENT (OUTPATIENT)
Dept: ORTHOPEDIC SURGERY | Facility: CLINIC | Age: 76
End: 2021-08-13
Payer: MEDICARE

## 2021-08-13 VITALS
HEART RATE: 92 BPM | WEIGHT: 180 LBS | SYSTOLIC BLOOD PRESSURE: 175 MMHG | BODY MASS INDEX: 36.29 KG/M2 | DIASTOLIC BLOOD PRESSURE: 89 MMHG | HEIGHT: 59 IN

## 2021-08-13 DIAGNOSIS — M54.30 SCIATICA, UNSPECIFIED SIDE: ICD-10-CM

## 2021-08-13 PROCEDURE — 99213 OFFICE O/P EST LOW 20 MIN: CPT

## 2021-08-13 RX ORDER — CYCLOBENZAPRINE HYDROCHLORIDE 10 MG/1
10 TABLET, FILM COATED ORAL 3 TIMES DAILY
Qty: 42 | Refills: 1 | Status: ACTIVE | COMMUNITY
Start: 2021-08-13 | End: 1900-01-01

## 2021-08-13 NOTE — HISTORY OF PRESENT ILLNESS
[de-identified] : Today the patient states that she is struggling with back and her shoulder pain.  She saw her in some radiating pain down her posterior thigh posterior calf but this is improved to some degree from its worst points earlier this month.  She denies any bowel bladder issues.  She denies any saddle anesthesia.  She was able to walk in the exam room.\par \par 07/23/21\par This is a 76-year-old female here today for evaluation of her severe back and lower extremity pain.  She has pain that radiates down her posterior lateral thigh posterior lateral calf.  Unfortunately the symptoms have been worsening over the past several months.  She describes pain which does interfere with her ability to walk.  She can not walk even a block at this point.  She does feel slightly better when she does lean forward.  She denies any bowel bladder issues.  She denies any saddle anesthesia.

## 2021-08-13 NOTE — PHYSICAL EXAM
[de-identified] : Lumbar Physical Exam\par \par Gait - Normal\par \par Station - Normal\par \par Sagittal balance - Normal\par \par Compensatory mechanism? - None\par \par Heel walk - Normal\par \par Toe walk - Normal\par \par Reflexes\par Patellar - normal\par Gastroc - normal\par Clonus - No\par \par Hip Exam - Normal\par \par Straight leg raise - none\par \par Pulses - 2+ dp/pt\par \par Range of motion - normal\par \par Sensation \par Sensation intact to light touch in L1, L2, L3, L4, L5 and S1 dermatomes bilaterally\par \par Motor\par 	IP	Quad	HS	TA	Gastroc	EHL\par Right	5/5	5/5	5/5	5/5	5/5	5/5\par Left	5/5	5/5	5/5	5/5	5/5	5/5 [de-identified] : Scoliosis radiographs\par SVA within normal limits for patient of her age\par Lumbar lordosis and pelvic incidence roughly equal\par \par Lumbar radiographs\par No obvious instability on flexion-extension views\par \par Lumbar MRI reviewed\par No areas of critical central stenosis\par No areas of critical foraminal stenosis

## 2022-01-01 NOTE — DISCHARGE NOTE NURSING/CASE MANAGEMENT/SOCIAL WORK - PATIENT PORTAL LINK FT
You can access the FollowMyHealth Patient Portal offered by [facility] by registering at the following website: [email or web address]. By joining [employer]’s FollowMyHealth portal, you will also be able to view your health information using other applications (apps) compatible with our system.
97.8

## 2022-02-18 ENCOUNTER — EMERGENCY (EMERGENCY)
Facility: HOSPITAL | Age: 77
LOS: 1 days | Discharge: HOME | End: 2022-02-18
Attending: EMERGENCY MEDICINE
Payer: MEDICARE

## 2022-02-18 VITALS
DIASTOLIC BLOOD PRESSURE: 84 MMHG | OXYGEN SATURATION: 96 % | HEART RATE: 79 BPM | SYSTOLIC BLOOD PRESSURE: 160 MMHG | RESPIRATION RATE: 19 BRPM

## 2022-02-18 VITALS
WEIGHT: 175.27 LBS | HEART RATE: 93 BPM | SYSTOLIC BLOOD PRESSURE: 189 MMHG | RESPIRATION RATE: 19 BRPM | TEMPERATURE: 98 F | DIASTOLIC BLOOD PRESSURE: 84 MMHG | HEIGHT: 56 IN | OXYGEN SATURATION: 96 %

## 2022-02-18 LAB
ALBUMIN SERPL ELPH-MCNC: 3.6 G/DL — SIGNIFICANT CHANGE UP (ref 3.5–5)
ALP SERPL-CCNC: 74 U/L — SIGNIFICANT CHANGE UP (ref 40–120)
ALT FLD-CCNC: 29 U/L DA — SIGNIFICANT CHANGE UP (ref 10–60)
ANION GAP SERPL CALC-SCNC: 3 MMOL/L — LOW (ref 5–17)
APPEARANCE UR: CLEAR — SIGNIFICANT CHANGE UP
AST SERPL-CCNC: 16 U/L — SIGNIFICANT CHANGE UP (ref 10–40)
BASOPHILS # BLD AUTO: 0.03 K/UL — SIGNIFICANT CHANGE UP (ref 0–0.2)
BASOPHILS NFR BLD AUTO: 0.5 % — SIGNIFICANT CHANGE UP (ref 0–2)
BILIRUB SERPL-MCNC: 0.3 MG/DL — SIGNIFICANT CHANGE UP (ref 0.2–1.2)
BILIRUB UR-MCNC: NEGATIVE — SIGNIFICANT CHANGE UP
BUN SERPL-MCNC: 17 MG/DL — SIGNIFICANT CHANGE UP (ref 7–18)
CALCIUM SERPL-MCNC: 9.1 MG/DL — SIGNIFICANT CHANGE UP (ref 8.4–10.5)
CHLORIDE SERPL-SCNC: 106 MMOL/L — SIGNIFICANT CHANGE UP (ref 96–108)
CO2 SERPL-SCNC: 33 MMOL/L — HIGH (ref 22–31)
COLOR SPEC: YELLOW — SIGNIFICANT CHANGE UP
CREAT SERPL-MCNC: 0.73 MG/DL — SIGNIFICANT CHANGE UP (ref 0.5–1.3)
DIFF PNL FLD: NEGATIVE — SIGNIFICANT CHANGE UP
EOSINOPHIL # BLD AUTO: 0.18 K/UL — SIGNIFICANT CHANGE UP (ref 0–0.5)
EOSINOPHIL NFR BLD AUTO: 3.2 % — SIGNIFICANT CHANGE UP (ref 0–6)
GLUCOSE SERPL-MCNC: 112 MG/DL — HIGH (ref 70–99)
GLUCOSE UR QL: NEGATIVE — SIGNIFICANT CHANGE UP
HCT VFR BLD CALC: 39.1 % — SIGNIFICANT CHANGE UP (ref 34.5–45)
HGB BLD-MCNC: 13.4 G/DL — SIGNIFICANT CHANGE UP (ref 11.5–15.5)
IMM GRANULOCYTES NFR BLD AUTO: 0.2 % — SIGNIFICANT CHANGE UP (ref 0–1.5)
KETONES UR-MCNC: NEGATIVE — SIGNIFICANT CHANGE UP
LEUKOCYTE ESTERASE UR-ACNC: ABNORMAL
LYMPHOCYTES # BLD AUTO: 1.74 K/UL — SIGNIFICANT CHANGE UP (ref 1–3.3)
LYMPHOCYTES # BLD AUTO: 31.2 % — SIGNIFICANT CHANGE UP (ref 13–44)
MCHC RBC-ENTMCNC: 30.9 PG — SIGNIFICANT CHANGE UP (ref 27–34)
MCHC RBC-ENTMCNC: 34.3 GM/DL — SIGNIFICANT CHANGE UP (ref 32–36)
MCV RBC AUTO: 90.1 FL — SIGNIFICANT CHANGE UP (ref 80–100)
MONOCYTES # BLD AUTO: 0.48 K/UL — SIGNIFICANT CHANGE UP (ref 0–0.9)
MONOCYTES NFR BLD AUTO: 8.6 % — SIGNIFICANT CHANGE UP (ref 2–14)
NEUTROPHILS # BLD AUTO: 3.13 K/UL — SIGNIFICANT CHANGE UP (ref 1.8–7.4)
NEUTROPHILS NFR BLD AUTO: 56.3 % — SIGNIFICANT CHANGE UP (ref 43–77)
NITRITE UR-MCNC: NEGATIVE — SIGNIFICANT CHANGE UP
NRBC # BLD: 0 /100 WBCS — SIGNIFICANT CHANGE UP (ref 0–0)
PH UR: 6.5 — SIGNIFICANT CHANGE UP (ref 5–8)
PLATELET # BLD AUTO: 223 K/UL — SIGNIFICANT CHANGE UP (ref 150–400)
POTASSIUM SERPL-MCNC: 4.3 MMOL/L — SIGNIFICANT CHANGE UP (ref 3.5–5.3)
POTASSIUM SERPL-SCNC: 4.3 MMOL/L — SIGNIFICANT CHANGE UP (ref 3.5–5.3)
PROT SERPL-MCNC: 7.5 G/DL — SIGNIFICANT CHANGE UP (ref 6–8.3)
PROT UR-MCNC: NEGATIVE — SIGNIFICANT CHANGE UP
RBC # BLD: 4.34 M/UL — SIGNIFICANT CHANGE UP (ref 3.8–5.2)
RBC # FLD: 13.1 % — SIGNIFICANT CHANGE UP (ref 10.3–14.5)
SARS-COV-2 RNA SPEC QL NAA+PROBE: SIGNIFICANT CHANGE UP
SODIUM SERPL-SCNC: 142 MMOL/L — SIGNIFICANT CHANGE UP (ref 135–145)
SP GR SPEC: 1.01 — SIGNIFICANT CHANGE UP (ref 1.01–1.02)
UROBILINOGEN FLD QL: NEGATIVE — SIGNIFICANT CHANGE UP
WBC # BLD: 5.57 K/UL — SIGNIFICANT CHANGE UP (ref 3.8–10.5)
WBC # FLD AUTO: 5.57 K/UL — SIGNIFICANT CHANGE UP (ref 3.8–10.5)

## 2022-02-18 PROCEDURE — 87635 SARS-COV-2 COVID-19 AMP PRB: CPT

## 2022-02-18 PROCEDURE — 74176 CT ABD & PELVIS W/O CONTRAST: CPT | Mod: 26,ME

## 2022-02-18 PROCEDURE — 74176 CT ABD & PELVIS W/O CONTRAST: CPT | Mod: ME

## 2022-02-18 PROCEDURE — G1004: CPT

## 2022-02-18 PROCEDURE — 81001 URINALYSIS AUTO W/SCOPE: CPT

## 2022-02-18 PROCEDURE — 96374 THER/PROPH/DIAG INJ IV PUSH: CPT

## 2022-02-18 PROCEDURE — 99284 EMERGENCY DEPT VISIT MOD MDM: CPT

## 2022-02-18 PROCEDURE — 36415 COLL VENOUS BLD VENIPUNCTURE: CPT

## 2022-02-18 PROCEDURE — 87086 URINE CULTURE/COLONY COUNT: CPT

## 2022-02-18 PROCEDURE — 80053 COMPREHEN METABOLIC PANEL: CPT

## 2022-02-18 PROCEDURE — 99284 EMERGENCY DEPT VISIT MOD MDM: CPT | Mod: 25

## 2022-02-18 PROCEDURE — 85025 COMPLETE CBC W/AUTO DIFF WBC: CPT

## 2022-02-18 RX ORDER — KETOROLAC TROMETHAMINE 30 MG/ML
15 SYRINGE (ML) INJECTION ONCE
Refills: 0 | Status: DISCONTINUED | OUTPATIENT
Start: 2022-02-18 | End: 2022-02-18

## 2022-02-18 RX ADMIN — Medication 15 MILLIGRAM(S): at 19:09

## 2022-02-18 NOTE — ED PROVIDER NOTE - NSFOLLOWUPINSTRUCTIONS_ED_ALL_ED_FT
Dolor sinan de la parte inferior de la espalda    LO QUE NECESITA SABER:    El dolor sinan de la región lumbar de la espalda es franki molestia repentina que dura hasta 6 semanas y que dificulta la actividad.    INSTRUCCIONES SOBRE [facility]:    Regrese a la jeovanny de emergencias si:  •Usted tiene dolor intenso.      •Usted repentinamente tiene rigidez o siente pesadez en ambos glúteos hacia abajo de ambas piernas.      •Usted tiene entumecimiento o debilidad en franki pierna o dolor en ambas piernas.      •Usted tiene entumecimiento en el área genital o en la región lumbar.      •Usted no puede controlar zeng orina ni rossy deposiciones intestinales.      Llame a zeng médico si:  •Tiene fiebre.      •Usted tiene un dolor por la noche o cuando descansa.      •Zeng dolor no mejora con el tratamiento.      •Usted tiene dolor que empeora cuando tose o estornuda.      •Usted siente un estallido o chasquido repentino en zeng espalda.      •Usted tiene preguntas o inquietudes acerca de zeng condición o cuidado.      Medicamentos:Es posible que usted necesite alguno de los siguientes:  •Los DENISHA,dewayne el ibuprofeno, ayudan a disminuir la inflamación, el dolor y la fiebre. Suly medicamento está disponible con o sin franki receta médica. Los DENISHA pueden causar sangrado estomacal o problemas renales en ciertas personas. Si usted abbi un medicamento anticoagulante, siempre pregúntele a zeng médico si los DENISHA son seguros para usted. Siempre letty la etiqueta de suly medicamento y siga las instrucciones.      •Acetaminofénalivia el dolor y baja la fiebre. Está disponible sin receta médica. Pregunte la cantidad y la frecuencia con que debe tomarlos. Siga las indicaciones. Letty las etiquetas de todos los demás medicamentos que esté usando para saber si también contienen acetaminofén, o pregunte a zeng médico o farmacéutico. El acetaminofén puede causar daño en el hígado cuando no se abbi de forma correcta. No use más de 4 gramos (4000 miligramos) en total de acetaminofeno en un día.      •Relajantes muscularesdisminuyen el dolor y relajan los músculos de la parte inferior de la columna.      •Puede administrarsepodrían administrarse. Pregunte al médico cómo debe cecilia suly medicamento de forma lund. Algunos medicamentos recetados para el dolor contienen acetaminofén. No tome otros medicamentos que contengan acetaminofén sin consultarlo con zeng médico. Demasiado acetaminofeno puede causar daño al hígado. Los medicamentos recetados para el dolor podrían causar estreñimiento. Pregunte a zeng médico dewayne prevenir o tratar estreñimiento.      Cuidados personales:  •Manténgase activolo más que pueda sin causar más dolor. El reposo en cama puede empeorar zeng dolor de espalda. Comience con ejercicios ligeros, dewayne caminar. Evite levantar objetos hasta que ya no tenga dolor. Solicite más información acerca de las actividades físicas o plan de ejercicios que son los adecuados para usted.  Domingo hispana caminando dewayne ejercicio           •Aplique caloren la espalda de 20 a 30 minutos cada 2 horas por los días que le indiquen. El calor ayuda a disminuir el dolor y los espasmos musculares. Alterne entre el calor y el hielo.      •Aplique hieloen la espalda de 15 a 20 minutos cada hora o dewayne se le indique. Use franki compresa de hielo o ponga hielo triturado en franki bolsa de plástico. Cúbralo con franki toalla antes de aplicarlo sobre zeng piel. El hielo ayuda a evitar daño al tejido y a disminuir la inflamación y el dolor.      Prevenir el dolor sinan de la parte inferior de la espalda:  •Use la mecánica corporal adecuada.?Flexione la cadera y las rodillas cuando vaya a levantar un objeto. No doble la cintura. Utilice los músculos de las piernas mientras levanta zeng carga. No use zeng espalda. Mantenga el objeto cerca de zeng pecho mientras lo levanta. No se tuerza, ni levante cualquier cosa por encima de zeng cintura.  Cómo levantar objetos de forma lund           ?Cambie zeng posición frecuentemente cuando pase mucho tiempo de pie. Descanse un pie sobre franki caja pequeña o un reposapiés e intercambie con el otro pie frecuentemente.      ?No permanezca sentado por lapsos de tiempo prolongados. Cuando sea necesario hacerlo, siéntese en franki silla de respaldo recto con los pies apoyados en el suelo. Nunca alcance, jale ni empuje mientras se encuentra sentando.      •Laina ejercicios que fortalezcan rossy músculos de la espalda.Entre en calor antes de hacer ejercicio. Consulte con zeng médico sobre el mejor plan de ejercicios para usted.  Calentamiento y enfriamiento           •Mantenga un peso saludable.Pregúntele a zeng médico cuál es el peso ideal para usted. Pídale que lo ayude a crear un plan para bajar de peso si tiene sobrepeso.      Acuda a la consulta de control con zeng médico según las indicaciones:Regrese a franki karo de seguimiento si usted aún tiene dolor después de 1 a 3 semanas de tratamiento. Naif vez necesite acudir con un ortopedista si zeng dolor de espalda dura más de 12 semanas. Anote rossy preguntas para que se acuerde de hacerlas kris rossy visitas.       © Copyright [employer] 2022           back to top                          © Copyright [employer] 2022

## 2022-02-18 NOTE — ED ADULT TRIAGE NOTE - CHIEF COMPLAINT QUOTE
Left lower back pain x 1 week. Denies fall/trauma, dysuria, hematuria., Sent by PCP to r/o kidney stone.,

## 2022-02-18 NOTE — ED ADULT NURSE NOTE - OBJECTIVE STATEMENT
Patient presents to ED with c/o left side flank pain associated with movement. reports recent UTI finished antibiotics 2-3 weeks ago. denies burning with urination

## 2022-02-18 NOTE — ED PROVIDER NOTE - NSICDXPASTMEDICALHX_GEN_ALL_CORE_FT
PAST MEDICAL HISTORY:  Asthma     Gall stones     Glaucoma     HLD (hyperlipidemia)     HTN (hypertension)

## 2022-02-18 NOTE — ED PROVIDER NOTE - PATIENT PORTAL LINK FT
You can access the FollowMyHealth Patient Portal offered by [facility] by registering at the following website: [email or web address]. By joining [email or web address]’s FollowMyHealth portal, you will also be able to view your health information using other applications (apps) compatible with our system.

## 2022-02-18 NOTE — ED ADULT NURSE NOTE - NSIMPLEMENTINTERV_GEN_ALL_ED
Implemented All Universal Safety Interventions:  Rescue to call system. Call bell, personal items and telephone within reach. Instruct patient to call for assistance. Room bathroom lighting operational. Non-slip footwear when patient is off stretcher. Physically safe environment: no spills, clutter or unnecessary equipment. Stretcher in lowest position, wheels locked, appropriate side rails in place.

## 2022-02-18 NOTE — ED PROVIDER NOTE - CLINICAL SUMMARY MEDICAL DECISION MAKING FREE TEXT BOX
Patient with back pain for 1 week, only with movement. Sent to r/o kidney stone. Will get labs, CTAP. Most likely musculoskeletal given history and exam.

## 2022-02-18 NOTE — ED PROVIDER NOTE - OBJECTIVE STATEMENT
Patient reports 1 week left low back pain radiating to left flank. has been taking naproxen with mild relief. Saw PMD, who sent her to ED to r/o kidney stone. No pain at rest. Pain brought on by walking, twisting, raising left arm. No fever, cp, sob, ap, n/v/d, dysuria, urgency, frequency, hematuria.

## 2022-02-18 NOTE — ED PROVIDER NOTE - PROGRESS NOTE DETAILS
Patient resting comfortably, feels moderately improved. To f/u with PMD next week to get referral to PT. Return to the ED immediately if getting worse, not improving, or if having any new or troubling symptoms.

## 2022-02-20 LAB
CULTURE RESULTS: SIGNIFICANT CHANGE UP
SPECIMEN SOURCE: SIGNIFICANT CHANGE UP

## 2022-08-12 ENCOUNTER — APPOINTMENT (OUTPATIENT)
Dept: ORTHOPEDIC SURGERY | Facility: CLINIC | Age: 77
End: 2022-08-12

## 2022-08-12 DIAGNOSIS — M25.562 PAIN IN LEFT KNEE: ICD-10-CM

## 2022-08-12 PROCEDURE — J3490M: CUSTOM

## 2022-08-12 PROCEDURE — 73564 X-RAY EXAM KNEE 4 OR MORE: CPT | Mod: LT

## 2022-08-12 PROCEDURE — 99204 OFFICE O/P NEW MOD 45 MIN: CPT | Mod: 25

## 2022-08-12 PROCEDURE — 20610 DRAIN/INJ JOINT/BURSA W/O US: CPT | Mod: LT

## 2022-08-12 RX ORDER — MELOXICAM 15 MG/1
15 TABLET ORAL DAILY
Qty: 20 | Refills: 1 | Status: ACTIVE | COMMUNITY
Start: 2022-08-12 | End: 1900-01-01

## 2022-08-12 NOTE — PROCEDURE
[Left] : of the left [Knee] : knee [de-identified] : Left knee injection was performed. The indication for this injection was pain and inflammation. The site was prepped with alcohol, betadine, and ethyl chloride was sprayed topically and sterile technique was used. An injection of Bupivicaine (Marcaine) 4cc of 0.25%, and Triamcinolone (Kenalog) 1cc of 40mg was used. The patient tolerated the injection well without any complications. Patient was advised to call if redness, pain or fever occur, apply ice for 15 minutes out of every hours for the first 12-24 hours as tolerated and patient advised to rest the joint for 2-3 days.

## 2022-08-12 NOTE — ASSESSMENT
[FreeTextEntry1] : Knee Arthritis- Nsaids and Steroid Injection\par The patient was found to have knee arthritis based on their history, physical exam and x-ray findings. We had a discussion regarding this condition along with its natural history and treatment options. The patient was told that in the future they will likely have flare ups of pain that are usually related to activity and will typically improve with anti-inflammatory medications. Low impact exercise has been shown to help decrease the overall level of pain in patients with arthritis. Lastly we discussed the use of injections for pain relief. We talked both about steroid injections and Visco-supplementation. I told them that I normally will try a steroid injection first and if there is no improvement I will then try the Visco-supplementation. Pain relief from an injection may last anywhere from a few weeks to a few months to a year depending on the patient. The patient elected to proceed with the steroid injection today. After the risks and benefits of the injection were discussed, the knee was injected anteriorly under sterile conditions with good relief of pain and without complication. If the steroid injection fails to improve their pain in the next 10-14 days I have asked them to contact me and we can proceed with Visco-supplementation. If they get significant relief from the steroid injection they may receive repeat injections in the future on an as needed basis.\par

## 2022-08-12 NOTE — HISTORY OF PRESENT ILLNESS
[Sudden] : sudden [9] : 9 [8] : 8 [Dull/Aching] : dull/aching [Localized] : localized [Sharp] : sharp [Intermittent] : intermittent [Household chores] : household chores [Walking] : walking [Exercising] : exercising [Stairs] : stairs [Retired] : Work status: retired [de-identified] : Ms. RODRIGUEZ is a 77 year female who presents today for evaluation of their left knee pain and swelling.  She states that starting 1 week ago after doing a increased amount of activity and climbing up and down stairs.  She denies any history of trauma or injury or twisting of her knee over these past week to 10 days.  She has had a history of trauma and a fall onto the left knee many years ago.  She did have some intermittent pain and soreness in her knees as she does have a history of being diagnosed with knee arthritis in the past.  She does not notice any redness or warmth of her knee she denies any catching or locking of her knee. [] : no [FreeTextEntry1] : LT Knee  [FreeTextEntry5] : Kristin is a 77 year old female who is here today for her LT Knee.\par She states that her knee has been bothering her with pain since last Friday.\par Has a little bump on the knee and it is warm to the touch.\par Using a cane to walk around.\par States she was just walking and doing daily activates when she started to notice the pain.\par Has history of arteritis in the knees. \par No fall or accident.\par Doing more then usual when the pain started to act up in the knee.\par Unable to bend the knee and have full range of motion. \par  [FreeTextEntry8] : Walking

## 2022-08-12 NOTE — PHYSICAL EXAM
[de-identified] : General: Well-nourished, well developed, in no apparent distress\par Psych: Clear speech, pleasant mood and affect\par Eyes: Extraocular movements intact, no scleral icterus, pupils are symmetric\par Neurological: Alert and oriented to person, place, and time\par Gait: Normal\par Respiratory: Normal respiratory effort\par Lymph: No lymphedema present\par Skin: No rashes, no lesions, no open skin, no ecchymosis, no erythema.\par \par On examination of the knee: Knee alignment is in slight valgus. There is mild swelling along the anterior aspect of the knee joint with a mild effusion noted. No erythema induration or warmth is noted. There is no pain over the proximal midshaft or distal femur. No pain over the distal femoral condyles.  She is tender more on the medial knee joint line where there is more swelling.  She is mildly tender over the lateral knee joint line.  No pain over the MCL or LCL or tibial plateau. No pain over the fibular head. No pain over the quadriceps tendon, patellar, patellar tendon or tibial tubercle. No pain over the patellar retinaculum medial or laterally. The patella appears to be tracking well. Negative patellar apprehension test. No pain or mass over the popliteal fossa. There is good range of motion of the knee with flexion of 130 degrees of full extension with 4/5 strength of the quadriceps and hamstrings. No instability on varus valgus stress and negative anterior and posterior drawer testing. There is no pain over the proximal midshaft or distal tibia or fibula. The calf is soft and nontender with a downgoing Juárez test. There is full dorsiflexion plantarflexion inversion and eversion of the ankle and hindfoot with 5/5 strength throughout muscle groups. No pain over the midfoot or forefoot. Sensation is grossly intact throughout to light touch Down Babinski test. 2+ patellar tendon and Achilles tendon reflexes and palpable dorsalis pedis and posterior tibial pulses.\par X-rays done today in the office 3 views of the knee which reveal no acute fractures or dislocations the knee joint is reduced and well aligned and the patella appears to be tracking slightly lateral with a buildup of bone along the lateral edge of the patella with a old fracture fragment noted.  There is tricompartmental arthritis more involving the patellofemoral articulation and medial joint compartment

## 2022-09-09 ENCOUNTER — APPOINTMENT (OUTPATIENT)
Dept: ORTHOPEDIC SURGERY | Facility: CLINIC | Age: 77
End: 2022-09-09

## 2022-10-10 ENCOUNTER — APPOINTMENT (OUTPATIENT)
Dept: ORTHOPEDIC SURGERY | Facility: CLINIC | Age: 77
End: 2022-10-10

## 2022-10-10 DIAGNOSIS — M19.90 UNSPECIFIED OSTEOARTHRITIS, UNSPECIFIED SITE: ICD-10-CM

## 2022-10-10 PROCEDURE — 20610 DRAIN/INJ JOINT/BURSA W/O US: CPT | Mod: LT

## 2022-10-10 PROCEDURE — 73564 X-RAY EXAM KNEE 4 OR MORE: CPT | Mod: LT

## 2022-10-10 PROCEDURE — 99203 OFFICE O/P NEW LOW 30 MIN: CPT | Mod: 25

## 2022-10-10 PROCEDURE — 99213 OFFICE O/P EST LOW 20 MIN: CPT | Mod: 25

## 2022-10-10 RX ORDER — MELOXICAM 15 MG/1
15 TABLET ORAL
Qty: 30 | Refills: 2 | Status: ACTIVE | COMMUNITY
Start: 2022-10-10 | End: 1900-01-01

## 2022-10-10 NOTE — PROCEDURE
[FreeTextEntry3] : Large joint Viscosupplementation Injection: Left Knee\par \par Patient indicated for injection after trial of rest, OTC medications including aspirin, Ibuprofen, Aleve etc or prescription NSAIDS, and/or exercises at home and/ or physical therapy without satisfactory response.  Patient has symptoms including pain, swelling, and/or decreased mobility in the joint. The risks, benefits, and alternatives to injection were explained in full to the patient, including but not limited to infection, sepsis, bleeding, scarring, skin discoloration, temporary increase in pain, syncopal episode, failure to resolve symptoms, allergic reaction, symptom recurrence, and elevation of blood sugar in diabetics. Patient understood the risks. All questions were answered. After discussion of options, patient requested an injection. \par \par Oral informed consent was obtained and sterile technique was utilized for the procedure including the preparation of the solutions used for the injection followed by alcohol prep to the injection site. Anesthesia was given with ethyl chloride sprayed topically. The injection was delivered. Patient tolerated the procedure well. \par \par Post Procedure Instructions: Patient was advised to call if redness, pain, or fever occur and apply ice for 15 min on and 15 min off later today\par \par Medications delivered:Euflexxa # 1\par

## 2022-10-10 NOTE — HISTORY OF PRESENT ILLNESS
[9] : 9 [Dull/Aching] : dull/aching [Localized] : localized [Stabbing] : stabbing [Throbbing] : throbbing [Constant] : constant [Household chores] : household chores [Nothing helps with pain getting better] : Nothing helps with pain getting better [Sitting] : sitting [Standing] : standing [Walking] : walking [Stairs] : stairs [de-identified] : 78 y/o F with b/l knee pain, L>R. Had CSI with Dr. Webb on 08/22. Reports CSI lasted temporarily for 6 weeks. Difficulty with ambulation and now needing a cane. \par PMH: HLD, no bloodthinners [] : no [FreeTextEntry1] : left knee [de-identified] : 08/12/22 [de-identified] : faby

## 2022-10-10 NOTE — IMAGING
[de-identified] : Knee Exam\par Alignment: Neutral \par Effusion: None\par Atrophy: None                                                \par Stable to Varus/valgus stress\par Posterior Drawer Test: negative\par Anterior Drawer Test: Negative\par Knee Extension/Flexion: R 5-110, L 0-120\par \par Medial/lateral compartments\par Medial joint line: POS Tenderness\par Lateral joint line: No Tenderness\par Gaby test: negative\par \par Patellofemoral joint\par Medial patellar facet: no tenderness\par Patellar grind: Negative\par \par Tendons:\par Pes Anserine: No tenderness\par Gerdy’s Tubercle/ IT Band: No tenderness\par Quadriceps Tendon: No Tenderness\par patellar tendon: no Tenderness\par Tibial tubercle: not tenderness\par Calf: no Tenderness\par \par Neurovascular exam\par Muscle strength: 5/5\par Sensation to light touch: intact\par Distal pulses: 2+\par \par \par IMAGING:\par 10/10/22: 4v of b/l knee showing b/l severe tricompartmental OA R>L. R knee bone on bone arthritis

## 2022-10-10 NOTE — ASSESSMENT
[FreeTextEntry1] : 76 y/o F with severe b/l knee OA \par \par -Will start euflexxa injection today\par -Follow up in one week for Euflexxa 2 of 3

## 2022-10-19 ENCOUNTER — APPOINTMENT (OUTPATIENT)
Dept: ORTHOPEDIC SURGERY | Facility: CLINIC | Age: 77
End: 2022-10-19

## 2022-10-19 PROCEDURE — 20610 DRAIN/INJ JOINT/BURSA W/O US: CPT | Mod: LT

## 2022-10-19 PROCEDURE — 99213 OFFICE O/P EST LOW 20 MIN: CPT | Mod: 25

## 2022-10-19 NOTE — ASSESSMENT
[FreeTextEntry1] : 76 y/o F with severe b/l knee OA \par \par -Will start euflexxa injection today\par -Follow up in one week for Euflexxa 2 of 3\par \par 10/19/2022  2 of 3 injection euflexxa today with lidocaine

## 2022-10-19 NOTE — IMAGING
[de-identified] : Knee Exam\par Alignment: Neutral \par Effusion: None\par Atrophy: None                                                \par Stable to Varus/valgus stress\par Posterior Drawer Test: negative\par Anterior Drawer Test: Negative\par Knee Extension/Flexion: R 5-110, L 0-120\par \par Medial/lateral compartments\par Medial joint line: POS Tenderness\par Lateral joint line: No Tenderness\par Gaby test: negative\par \par Patellofemoral joint\par Medial patellar facet: no tenderness\par Patellar grind: Negative\par \par Tendons:\par Pes Anserine: No tenderness\par Gerdy’s Tubercle/ IT Band: No tenderness\par Quadriceps Tendon: No Tenderness\par patellar tendon: no Tenderness\par Tibial tubercle: not tenderness\par Calf: no Tenderness\par \par Neurovascular exam\par Muscle strength: 5/5\par Sensation to light touch: intact\par Distal pulses: 2+\par \par \par IMAGING:\par 10/10/22: 4v of b/l knee showing b/l severe tricompartmental OA R>L. R knee bone on bone arthritis

## 2022-10-19 NOTE — HISTORY OF PRESENT ILLNESS
[9] : 9 [Dull/Aching] : dull/aching [Localized] : localized [Stabbing] : stabbing [Throbbing] : throbbing [Constant] : constant [Household chores] : household chores [Nothing helps with pain getting better] : Nothing helps with pain getting better [Sitting] : sitting [Standing] : standing [Walking] : walking [Stairs] : stairs [de-identified] : 78 y/o F with b/l knee pain, L>R. Had CSI with Dr. Webb on 08/22. Reports CSI lasted temporarily for 6 weeks. Difficulty with ambulation and now needing a cane. \par PMH: HLD, no bloodthinners\par \par \par 10/19/2022; [facility] is here for follow up L knee, Euflexxa INJ # 2, she still experiencing the same symptoms from last visit, 3 days vianney after the injection her pain level increased  [] : no [FreeTextEntry1] : left knee [de-identified] : 08/12/22 [de-identified] : faby

## 2022-10-19 NOTE — PROCEDURE
[FreeTextEntry3] : Large joint Viscosupplementation Injection: Left Knee\par \par Patient indicated for injection after trial of rest, OTC medications including aspirin, Ibuprofen, Aleve etc or prescription NSAIDS, and/or exercises at home and/ or physical therapy without satisfactory response.  Patient has symptoms including pain, swelling, and/or decreased mobility in the joint. The risks, benefits, and alternatives to injection were explained in full to the patient, including but not limited to infection, sepsis, bleeding, scarring, skin discoloration, temporary increase in pain, syncopal episode, failure to resolve symptoms, allergic reaction, symptom recurrence, and elevation of blood sugar in diabetics. Patient understood the risks. All questions were answered. After discussion of options, patient requested an injection. \par \par Oral informed consent was obtained and sterile technique was utilized for the procedure including the preparation of the solutions used for the injection followed by alcohol prep to the injection site. Anesthesia was given with ethyl chloride sprayed topically. The injection was delivered. Patient tolerated the procedure well. \par \par Post Procedure Instructions: Patient was advised to call if redness, pain, or fever occur and apply ice for 15 min on and 15 min off later today\par \par Medications delivered:Euflexxa # 2\par

## 2022-10-26 ENCOUNTER — APPOINTMENT (OUTPATIENT)
Dept: ORTHOPEDIC SURGERY | Facility: CLINIC | Age: 77
End: 2022-10-26

## 2022-10-26 DIAGNOSIS — M17.12 UNILATERAL PRIMARY OSTEOARTHRITIS, LEFT KNEE: ICD-10-CM

## 2022-10-26 PROCEDURE — 20610 DRAIN/INJ JOINT/BURSA W/O US: CPT | Mod: LT

## 2022-10-26 PROCEDURE — 99213 OFFICE O/P EST LOW 20 MIN: CPT | Mod: 25

## 2022-10-26 NOTE — HISTORY OF PRESENT ILLNESS
[9] : 9 [Dull/Aching] : dull/aching [Localized] : localized [Stabbing] : stabbing [Throbbing] : throbbing [Constant] : constant [Household chores] : household chores [Nothing helps with pain getting better] : Nothing helps with pain getting better [Sitting] : sitting [Standing] : standing [Walking] : walking [Stairs] : stairs [3] : 3 [Euflexxa] : Euflexxa [de-identified] : 76 y/o F with b/l knee pain, L>R. Had CSI with Dr. Webb on 08/22. Reports CSI lasted temporarily for 6 weeks. Difficulty with ambulation and now needing a cane. \par PMH: HLD, no bloodthinners\par \par \par 10/19/2022; [facility] is here for follow up L knee, Euflexxa INJ # 2, she still experiencing the same symptoms from last visit, 3 days vianney after the injection her pain level increased \par \par 10/26/2022; [facility] is here for follow up LT Knee , Euflexxa INJ # 3, reports pain when walking , standing and laying down , she does have some improvement , but pain mainly on the inner part of her knee [] : no [FreeTextEntry1] : left knee [de-identified] : 08/12/22 [de-identified] : faby [de-identified] : 10/19/2022

## 2022-10-26 NOTE — IMAGING
[de-identified] : Knee Exam\par Alignment: Neutral \par Effusion: None\par Atrophy: None                                                \par Stable to Varus/valgus stress\par Posterior Drawer Test: negative\par Anterior Drawer Test: Negative\par Knee Extension/Flexion: R 5-110, L 0-120\par \par Medial/lateral compartments\par Medial joint line: POS Tenderness\par Lateral joint line: No Tenderness\par Gaby test: negative\par \par Patellofemoral joint\par Medial patellar facet: no tenderness\par Patellar grind: Negative\par \par Tendons:\par Pes Anserine: No tenderness\par Gerdy’s Tubercle/ IT Band: No tenderness\par Quadriceps Tendon: No Tenderness\par patellar tendon: no Tenderness\par Tibial tubercle: not tenderness\par Calf: no Tenderness\par \par Neurovascular exam\par Muscle strength: 5/5\par Sensation to light touch: intact\par Distal pulses: 2+\par \par \par IMAGING:\par 10/10/22: 4v of b/l knee showing b/l severe tricompartmental OA R>L. R knee bone on bone arthritis

## 2022-10-26 NOTE — ASSESSMENT
[FreeTextEntry1] : 78 y/o F with severe b/l knee OA \par \par -Euflexxa 3 of 3 given, follow up in 6 weeks to assess efficacy

## 2022-10-26 NOTE — PROCEDURE
[FreeTextEntry3] : Large joint Viscosupplementation Injection: Left Knee\par \par Patient indicated for injection after trial of rest, OTC medications including aspirin, Ibuprofen, Aleve etc or prescription NSAIDS, and/or exercises at home and/ or physical therapy without satisfactory response.  Patient has symptoms including pain, swelling, and/or decreased mobility in the joint. The risks, benefits, and alternatives to injection were explained in full to the patient, including but not limited to infection, sepsis, bleeding, scarring, skin discoloration, temporary increase in pain, syncopal episode, failure to resolve symptoms, allergic reaction, symptom recurrence, and elevation of blood sugar in diabetics. Patient understood the risks. All questions were answered. After discussion of options, patient requested an injection. \par \par Oral informed consent was obtained and sterile technique was utilized for the procedure including the preparation of the solutions used for the injection followed by alcohol prep to the injection site. Anesthesia was given with ethyl chloride sprayed topically. The injection was delivered. Patient tolerated the procedure well. \par \par Post Procedure Instructions: Patient was advised to call if redness, pain, or fever occur and apply ice for 15 min on and 15 min off later today\par \par Medications delivered:Euflexxa # 3\par

## 2022-12-14 ENCOUNTER — APPOINTMENT (OUTPATIENT)
Dept: ORTHOPEDIC SURGERY | Facility: CLINIC | Age: 77
End: 2022-12-14

## 2022-12-30 NOTE — ED PROVIDER NOTE - NS_ ATTENDINGSCRIBEDETAILS _ED_A_ED_FT
Assessment & Plan     1  Bilateral oculomotor nerve palsy  Comments:  Resolved, continue to monitor she does have a follow-up appointment with her ophthalmologist    2  Pulmonary fibrosis ([facility] [address] )  Comments: We will obtain a CAT scan of the chest   Orders:  -     CT chest wo contrast; Future; Expected date: 12/30/2022       Subjective     Transitional Care Management Review:   Hilda Lopez is a 67 y o  female here for TCM follow up  During the TCM phone call patient stated:  TCM Call     Date and time call was made  12/27/2022 10:32 AM    Hospital care reviewed  Records reviewed    Patient was hospitialized at  [facility]    Date of Admission  12/21/22    Date of discharge  12/23/22    Diagnosis  Diplopia    Disposition  Home    Were the patients medications reviewed and updated  Yes    Current Symptoms  None    Arm pain left side severity  Mild    Arm pain, left side, onset  Progressive      TCM Call     Clinical progress swelling  Improving    Post hospital issues  None    Should patient be enrolled in anticoag monitoring? Yes    Scheduled for follow up? Yes    Patients specialists  Cardiologist    Cardiologist name     [address]     Referrals needed  NO    Did you obtain your prescribed medications  Yes    Do you need help managing your prescriptions or medications  No    Is transportation to your appointment needed  No    I have advised the patient to call PCP with any new or worsening symptoms  Ita Arroyo/ MA    Living Arrangements  Spouse or Significiant other; Children    Support System  None    The type of support provided  Emotional    Do you have social support  Yes, as much as I need    Are you recieving any outpatient services  No    Are you recieving home care services  No    Are you using any community resources  No    Current waiver services  No    Have you fallen in the last 12 months  No    Interperter language line needed  No    Counseling  Patient    Counseling topics  patient and family education; instructions for management    Comments  I spoke with pt to follow up regarding her recent hospital discharge  Pt states that she is doing well and all of her symptoms have now resolved  Pt states she is not having any post hospital complications and will be seen on 12/30/22  Patient comes in today for transition of care management, she was admitted to [address] [place] [address] for 3rd nerve palsy  This was recommended by her ophthalmologist due to changes in her vision  During the hospital stay she had an MRI of the brain and a CTA of the brain and neck which was unremarkable with the exception of some noted pulmonary fibrosis in the superior portion of her lungs on the CAT scan  She states that her vision symptoms have completely resolved and are now normal and she denies any difficulty in breathing      Review of Systems    Objective     /68 (BP Location: Left arm, Patient Position: Sitting, Cuff Size: Large)   Pulse 72   Temp (!) 96 4 °F (35 8 °C)   Ht 5' 5" (1 651 m)   Wt 90 3 kg (199 lb)   SpO2 98%   BMI 33 12 kg/m²      Physical Exam  Medications have been reviewed by provider in current encounter    Nimesh Marquez,  I personally performed the service described in the documentation recorded by the scribe in my presence, and it accurately and completely records my words and actions.

## 2023-07-20 NOTE — ED PROVIDER NOTE - ENDOCRINE NEGATIVE STATEMENT, MLM
Care Management Follow Up    Length of Stay (days): 17    Expected Discharge Date: 07/21/2023     Concerns to be Addressed: discharge planning     Patient plan of care discussed at interdisciplinary rounds: Yes    Anticipated Discharge Disposition: Group Home, Home Care     Anticipated Discharge Services: None  Anticipated Discharge DME:  (per treatment team)    Patient/family educated on Medicare website which has current facility and service quality ratings:  (N/A)  Education Provided on the Discharge Plan: Yes  Patient/Family in Agreement with the Plan: yes    Referrals Placed by CM/SW: Homecare  Private pay costs discussed: Not applicable    Additional Information:  Discharge to group home tomorrow 7/21. Mother and guardian Chrissy is agreeable to this.  [facility] patient for [facility] care, patient is ambulating up to 250 ft with no assistive device per PT notes.   left with group home [phone] (H) to provide update.      Barbara Hernandez LGSW         no diabetes and no thyroid trouble.

## 2024-02-20 NOTE — CONSULT NOTE ADULT - CONSULT REASON
Pre operative medical evaluation for Laparoscopic Cholecystectomy
gyn evaluation for vaginal bleeding
[phone]

## 2024-09-09 NOTE — ED ADULT NURSE NOTE - NSHISCREENINGQ1_ED_A_ED
Called patient to schedule MFM appointment, based on referral issued to Maternal Fetal Medicine by OB office.      Left voicemail requesting patient to call back and schedule appointment, with office number for return call [phone].    
No

## 2025-07-30 ENCOUNTER — APPOINTMENT (OUTPATIENT)
Dept: ORTHOPEDIC SURGERY | Facility: CLINIC | Age: 80
End: 2025-07-30
Payer: MEDICARE

## 2025-07-30 ENCOUNTER — TRANSCRIPTION ENCOUNTER (OUTPATIENT)
Age: 80
End: 2025-07-30

## 2025-07-30 DIAGNOSIS — M54.16 RADICULOPATHY, LUMBAR REGION: ICD-10-CM

## 2025-07-30 DIAGNOSIS — Z00.00 ENCOUNTER FOR GENERAL ADULT MEDICAL EXAMINATION W/OUT ABNORMAL FINDINGS: ICD-10-CM

## 2025-07-30 PROCEDURE — 99204 OFFICE O/P NEW MOD 45 MIN: CPT

## 2025-07-30 PROCEDURE — 72100 X-RAY EXAM L-S SPINE 2/3 VWS: CPT

## 2025-07-30 PROCEDURE — 99214 OFFICE O/P EST MOD 30 MIN: CPT

## 2025-07-30 PROCEDURE — 72170 X-RAY EXAM OF PELVIS: CPT

## 2025-07-30 RX ORDER — MELOXICAM 15 MG/1
15 TABLET ORAL
Qty: 30 | Refills: 2 | Status: ACTIVE | COMMUNITY
Start: 2025-07-30 | End: 1900-01-01

## 2025-07-30 RX ORDER — METHYLPREDNISOLONE 4 MG/1
4 TABLET ORAL
Qty: 1 | Refills: 0 | Status: ACTIVE | COMMUNITY
Start: 2025-07-30 | End: 1900-01-01

## 2025-08-18 ENCOUNTER — APPOINTMENT (OUTPATIENT)
Dept: ORTHOPEDIC SURGERY | Facility: CLINIC | Age: 80
End: 2025-08-18

## 2025-09-10 ENCOUNTER — APPOINTMENT (OUTPATIENT)
Dept: ORTHOPEDIC SURGERY | Facility: CLINIC | Age: 80
End: 2025-09-10